# Patient Record
Sex: FEMALE | Race: WHITE | NOT HISPANIC OR LATINO | Employment: OTHER | URBAN - METROPOLITAN AREA
[De-identification: names, ages, dates, MRNs, and addresses within clinical notes are randomized per-mention and may not be internally consistent; named-entity substitution may affect disease eponyms.]

---

## 2018-06-28 ENCOUNTER — TRANSCRIBE ORDERS (OUTPATIENT)
Dept: NEUROLOGY | Facility: CLINIC | Age: 78
End: 2018-06-28

## 2018-06-28 DIAGNOSIS — M54.2 CERVICALGIA: Primary | ICD-10-CM

## 2018-08-13 ENCOUNTER — OFFICE VISIT (OUTPATIENT)
Dept: NEUROLOGY | Facility: CLINIC | Age: 78
End: 2018-08-13
Payer: MEDICARE

## 2018-08-13 VITALS
BODY MASS INDEX: 33.45 KG/M2 | SYSTOLIC BLOOD PRESSURE: 160 MMHG | DIASTOLIC BLOOD PRESSURE: 74 MMHG | HEIGHT: 62 IN | HEART RATE: 72 BPM | WEIGHT: 181.8 LBS

## 2018-08-13 DIAGNOSIS — G44.86 CERVICOGENIC HEADACHE: Primary | ICD-10-CM

## 2018-08-13 DIAGNOSIS — E11.42 POLYNEUROPATHY DUE TO TYPE 2 DIABETES MELLITUS (HCC): ICD-10-CM

## 2018-08-13 DIAGNOSIS — M47.812 SPONDYLOSIS OF CERVICAL REGION WITHOUT MYELOPATHY OR RADICULOPATHY: ICD-10-CM

## 2018-08-13 PROCEDURE — 99204 OFFICE O/P NEW MOD 45 MIN: CPT | Performed by: PSYCHIATRY & NEUROLOGY

## 2018-08-13 RX ORDER — LISINOPRIL 10 MG/1
1 TABLET ORAL DAILY
COMMUNITY
Start: 2018-05-16

## 2018-08-13 RX ORDER — OMEPRAZOLE 40 MG/1
1 CAPSULE, DELAYED RELEASE ORAL DAILY
COMMUNITY
Start: 2018-03-14

## 2018-08-13 RX ORDER — DILTIAZEM HYDROCHLORIDE 300 MG/1
1 CAPSULE, COATED, EXTENDED RELEASE ORAL DAILY
COMMUNITY
Start: 2018-05-26

## 2018-08-13 RX ORDER — FENOFIBRATE 160 MG/1
1 TABLET ORAL DAILY
COMMUNITY
Start: 2018-05-29

## 2018-08-13 RX ORDER — NORTRIPTYLINE HYDROCHLORIDE 10 MG/1
CAPSULE ORAL
Qty: 30 CAPSULE | Refills: 5 | Status: SHIPPED | OUTPATIENT
Start: 2018-08-13

## 2018-08-13 RX ORDER — HYDROCHLOROTHIAZIDE 12.5 MG/1
1 CAPSULE, GELATIN COATED ORAL DAILY
COMMUNITY
Start: 2018-05-26

## 2018-08-13 RX ORDER — ISOSORBIDE MONONITRATE 30 MG/1
1 TABLET, EXTENDED RELEASE ORAL DAILY
COMMUNITY
Start: 2018-05-16

## 2018-08-13 RX ORDER — ROSUVASTATIN CALCIUM 10 MG/1
1 TABLET, COATED ORAL DAILY
COMMUNITY
Start: 2018-08-07

## 2018-08-13 RX ORDER — ACETAMINOPHEN 500 MG
1000 TABLET ORAL EVERY 8 HOURS
COMMUNITY
Start: 2018-04-02

## 2018-08-13 RX ORDER — SERTRALINE HYDROCHLORIDE 100 MG/1
1 TABLET, FILM COATED ORAL DAILY
COMMUNITY
Start: 2018-05-26

## 2018-08-13 NOTE — PROGRESS NOTES
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)        Plan:  Trial of Pamelor 10 mg at bedtime may gradually increase up to 30 mg if necessary  Follow-up 2 months    Discussion:  Rebeca Flak has cervical spondylosis with suspected cervicogenic headache  Had recommended pain management however she is resistant to consider this  Will try nortriptyline 10 mg at bedtime gradually increasing up to 30 mg over several weeks if necessary  We discussed potential adverse effects including sleepiness dizziness and dry mouth and if she has problems she will discontinue the medicine and notify me  Subjective:    HPI  Rebeca Falk presents today with the above complaints  She states that since she was a teenager she has had headaches  She states the headaches would occur once or twice a week and would not be disabling  She had no vascular features associated with the headache  She states that about 6 months ago she was doing some neck exercises and developed severe pain in her neck  She states that it hurts to move her head in any direction and occasionally the pain radiates from her neck up into her head associated with headache  She states that she tried physical therapy for a month and this did alleviate her pain to some degree but has not gotten rid of it  She reports that x-ray showed that she had cervical spondylosis and reports that she cannot have an MRI  She states that with her neck pain symptoms the headaches are now occurring on a daily basis    It was recommended that she try pain management however she is hesitant to consider this      Past Medical History:   Diagnosis Date    Anxiety and depression     Diabetes ([address] )     GERD (gastroesophageal reflux disease)     High cholesterol     Hypertension     Irregular heart beat     Low back pain     Lumbar spondylosis     Migraine     Neck pain     Osteoarthritis        Family History:  Family History   Problem Relation Age of Onset    Thrombosis Mother     Heart failure Father        Past Surgical History:  Past Surgical History:   Procedure Laterality Date    CARDIAC SURGERY      CORONARY ARTERY BYPASS GRAFT  1993    x2       Social History:   reports that she has never smoked  She has never used smokeless tobacco  She reports that she does not drink alcohol or use drugs      Allergies:  Carisoprodol and Codeine      Current Outpatient Prescriptions:     acetaminophen (TYLENOL) 500 mg tablet, Take 1,000 mg by mouth every 8 (eight) hours, Disp: , Rfl:     aspirin (ASPIRIN LOW DOSE) 81 MG tablet, Take 81 mg by mouth daily, Disp: , Rfl:     cholecalciferol (VITAMIN D3) 1,000 units tablet, Take 1,000 Units by mouth daily, Disp: , Rfl:     Coenzyme Q10 (COQ-10) 10 MG CAPS, Take 20 mg by mouth daily, Disp: , Rfl:     diltiazem (CARDIZEM CD) 300 mg 24 hr capsule, Take 1 capsule by mouth daily, Disp: , Rfl:     fenofibrate (TRIGLIDE) 160 MG tablet, Take 1 tablet by mouth daily, Disp: , Rfl:     hydrochlorothiazide (MICROZIDE) 12 5 mg capsule, Take 1 capsule by mouth daily, Disp: , Rfl:     isosorbide mononitrate (IMDUR) 30 mg 24 hr tablet, Take 1 tablet by mouth daily, Disp: , Rfl:     lisinopril (ZESTRIL) 10 mg tablet, Take 1 tablet by mouth daily, Disp: , Rfl:     Magnesium 400 MG CAPS, Take 400 mg by mouth 2 (two) times a day, Disp: , Rfl:     metFORMIN (GLUCOPHAGE) 500 mg tablet, Take 1 tablet by mouth 2 (two) times a day, Disp: , Rfl:     metoprolol tartrate (LOPRESSOR) 25 mg tablet, Take 1 tablet by mouth 2 (two) times a day, Disp: , Rfl:     omeprazole (PriLOSEC) 40 MG capsule, Take 1 capsule by mouth daily, Disp: , Rfl:     rosuvastatin (CRESTOR) 10 MG tablet, Take 1 tablet by mouth daily, Disp: , Rfl:     sertraline (ZOLOFT) 100 mg tablet, Take 1 tablet by mouth daily, Disp: , Rfl:     nortriptyline (PAMELOR) 10 mg capsule, 1-3 pills p o  q h s  as directed, Disp: 30 capsule, Rfl: 5    I have reviewed the past medical, social and family history, current medications, allergies, vitals, review of systems and updated this information as appropriate today     Objective:    Vitals:  Blood pressure 160/74, pulse 72, height 5' 1 5" (1 562 m), weight 82 5 kg (181 lb 12 8 oz)  Physical Exam    Neurological Exam    GENERAL:  Cooperative in no acute distress  Well-developed and well-nourished    HEAD and NECK   Head is atraumatic normocephalic with no lesions or masses  Neck is supple with restricted range of motion to lateral rotation bilaterally  Tenderness is noted in the lateral cervical regions as well as in the cervical paraspinal region bilaterally  No tenderness is noted in the scalp or in the region of the greater occipital nerves    CARDIOVASCULAR  Carotid Arteries-no carotid bruits  NEUROLOGIC:  Mental Status-the patient is awake alert and oriented without aphasia or apraxia  Cranial Nerves: Visual fields are full to confrontation  Discs are flat  Extraocular movements are full without nystagmus  Pupils are 2-1/2 mm and reactive  Face is symmetrical to light touch  Movements of facial expression move symmetrically  Hearing is normal to finger rub bilaterally  Soft palate lifts symmetrically  Shoulder shrug is symmetrical  Tongue is midline without atrophy  Motor: No drift is noted on arm extension  Strength is full in the upper and lower extremities with normal bulk and tone  Sensory:  Diminished temperature and vibratory sensation in the distal lower extremities bilaterally  Cortical function is intact  Coordination: Finger to nose testing is performed accurately  Romberg is reveals increased sway with eyes closed  Gait reveals a normal base with symmetrical arm swing  Tandem walk was performed with difficulty    Reflexes: Trace to 1 in the biceps, triceps, brachioradialis and knee jerk regions, absent at the ankles  Toes are downgoing                  ROS:    Review of Systems   Constitutional: Positive for fatigue  HENT: Positive for tinnitus  Negative for congestion, sinus pain, sinus pressure and trouble swallowing  Eyes: Negative for pain and visual disturbance  Respiratory: Positive for shortness of breath  Negative for cough and wheezing  Cardiovascular: Negative  Gastrointestinal: Negative for nausea and vomiting  Endocrine: Negative for cold intolerance and heat intolerance  Genitourinary: Negative for frequency, hematuria and urgency  Musculoskeletal: Positive for arthralgias, back pain, gait problem, neck pain and neck stiffness  Skin: Negative for rash and wound  Neurological: Positive for light-headedness and headaches  Negative for dizziness, tremors, seizures, syncope, facial asymmetry, speech difficulty, weakness and numbness  Hematological: Negative  Psychiatric/Behavioral: Positive for sleep disturbance  Negative for confusion and decreased concentration  All other systems reviewed and are negative

## 2021-04-23 ENCOUNTER — OFFICE VISIT (OUTPATIENT)
Dept: GASTROENTEROLOGY | Facility: CLINIC | Age: 81
End: 2021-04-23
Payer: MEDICARE

## 2021-04-23 VITALS
WEIGHT: 166 LBS | SYSTOLIC BLOOD PRESSURE: 130 MMHG | HEART RATE: 85 BPM | DIASTOLIC BLOOD PRESSURE: 64 MMHG | BODY MASS INDEX: 30.86 KG/M2

## 2021-04-23 DIAGNOSIS — R13.19 ESOPHAGEAL DYSPHAGIA: Primary | ICD-10-CM

## 2021-04-23 DIAGNOSIS — R93.3 ABNORMAL CT SCAN, ESOPHAGUS: ICD-10-CM

## 2021-04-23 DIAGNOSIS — R93.3 ABNORMAL CT SCAN, GASTROINTESTINAL TRACT: ICD-10-CM

## 2021-04-23 PROCEDURE — 99204 OFFICE O/P NEW MOD 45 MIN: CPT | Performed by: INTERNAL MEDICINE

## 2021-04-23 RX ORDER — POTASSIUM CHLORIDE 750 MG/1
TABLET, EXTENDED RELEASE ORAL
COMMUNITY
Start: 2021-02-08

## 2021-04-23 RX ORDER — LISINOPRIL 20 MG/1
20 TABLET ORAL 2 TIMES DAILY
COMMUNITY
Start: 2021-04-08

## 2021-04-23 RX ORDER — SITAGLIPTIN 25 MG/1
25 TABLET, FILM COATED ORAL DAILY
COMMUNITY
Start: 2021-04-09

## 2021-04-23 RX ORDER — PANTOPRAZOLE SODIUM 40 MG/1
40 TABLET, DELAYED RELEASE ORAL DAILY
COMMUNITY
Start: 2021-03-04

## 2021-04-23 NOTE — PROGRESS NOTES
[facility] - Outpatient Consultation  David Blanc 79 y o  female MRN: [id number]  Encounter: [id number]          ASSESSMENT AND PLAN:      1  Esophageal dysphagia  - EGD; Future    2  Abnormal CT scan, esophagus  - EGD; Future    3  Abnormal CT scan, gastrointestinal tract  - EGD; Future      No additional medications at this time  ______________________________________________________________________    HPI:    This 52-year-old female comes to the office today complaining of dysphagia to solid foods  Sometimes she is experiencing swelling problems even with liquids  She denies any weight loss or weight gain  She denies any abdominal pain  She denies nausea, vomiting, diarrhea, constipation, rectal bleeding, melena, hematemesis  A recent CT scan of the chest, abdomen, and pelvis performed on March 9, 2021 at [facility] showed bilateral sub 6 mm pulmonary nodules, gallstones, cardiomegaly with a 4 cm ascending thoracic aortic aneurysm, enlarged main pulmonary artery, atherosclerosis, degenerative changes of the lumbar spine, and circumferential distal esophageal and gastric wall thickening  The esophagogastroduodenoscopy was recommended  Patient had undergone esophagogastroduodenoscopy and colonoscopy on May 4, 2017 which showed a stomach polyp which was benign  She also underwent a [address] dilation based on the symptom of dysphagia  She had colonic diverticulosis at that time  Colonoscopy performed July 20, 2010 showed a polyp in the rectum which was hyperplastic along with diverticulosis coli  Esophagogastroduodenoscopy was normal at that time  Esophagogastroduodenoscopy performed November 13th 2003 showed grade 1 esophagitis and a hiatal hernia  REVIEW OF SYSTEMS:    CONSTITUTIONAL: Denies any fever, chills, rigors, and weight loss  HEENT: No earache or tinnitus  Denies hearing loss or visual disturbances    CARDIOVASCULAR: No chest pain or palpitations  RESPIRATORY: Denies any cough, hemoptysis, shortness of breath or dyspnea on exertion  GASTROINTESTINAL: As noted in the History of Present Illness  GENITOURINARY: No problems with urination  Denies any hematuria or dysuria  NEUROLOGIC: No dizziness or vertigo, denies headaches  MUSCULOSKELETAL: Denies any muscle or joint pain  SKIN: Denies skin rashes or itching  ENDOCRINE: Denies excessive thirst  Denies intolerance to heat or cold  PSYCHOSOCIAL: Denies depression or anxiety  Denies any recent memory loss         Historical Information   Past Medical History:   Diagnosis Date    Anxiety and depression     Diabetes ([address] )     GERD (gastroesophageal reflux disease)     High cholesterol     Hypertension     Irregular heart beat     Low back pain     Lumbar spondylosis     Migraine     Neck pain     Osteoarthritis      Past Surgical History:   Procedure Laterality Date    CARDIAC SURGERY      CORONARY ARTERY BYPASS GRAFT  1993    x2     Social History   Social History     Substance and Sexual Activity   Alcohol Use No     Social History     Substance and Sexual Activity   Drug Use No     Social History     Tobacco Use   Smoking Status Never Smoker   Smokeless Tobacco Never Used     Family History   Problem Relation Age of Onset    Thrombosis Mother     Heart failure Father        Meds/Allergies       Current Outpatient Medications:     acetaminophen (TYLENOL) 500 mg tablet    aspirin (ASPIRIN LOW DOSE) 81 MG tablet    cholecalciferol (VITAMIN D3) 1,000 units tablet    Coenzyme Q10 (COQ-10) 10 MG CAPS    diltiazem (CARDIZEM CD) 300 mg 24 hr capsule    fenofibrate (TRIGLIDE) 160 MG tablet    hydrochlorothiazide (MICROZIDE) 12 5 mg capsule    isosorbide mononitrate (IMDUR) 30 mg 24 hr tablet    Januvia 25 MG tablet    lisinopril (ZESTRIL) 10 mg tablet    lisinopril (ZESTRIL) 20 mg tablet    Magnesium 400 MG CAPS    metFORMIN (GLUCOPHAGE) 500 mg tablet   metoprolol tartrate (LOPRESSOR) 25 mg tablet    nortriptyline (PAMELOR) 10 mg capsule    omeprazole (PriLOSEC) 40 MG capsule    pantoprazole (PROTONIX) 40 mg tablet    potassium chloride (K-DUR,KLOR-CON) 10 mEq tablet    rosuvastatin (CRESTOR) 10 MG tablet    sertraline (ZOLOFT) 100 mg tablet    Allergies   Allergen Reactions    Carisoprodol Anaphylaxis     Aka (soma)    Codeine Anaphylaxis    Morphine And Related Other (See Comments)     FAINTING           Objective     Blood pressure 130/64, pulse 85, weight 75 3 kg (166 lb)  Body mass index is 30 86 kg/m²  PHYSICAL EXAM:      General Appearance:   Alert, cooperative, no distress   HEENT:   Normocephalic, atraumatic, anicteric      Neck:  Supple, symmetrical, trachea midline   Lungs:   Clear to auscultation bilaterally; no rales, rhonchi or wheezing; respirations unlabored    Heart[de-identified]   Regular rate and rhythm; no murmur, rub, or gallop  Abdomen:   Soft, non-tender, non-distended; normal bowel sounds; no masses, no organomegaly    Genitalia:   Deferred    Rectal:   Deferred    Extremities:  No cyanosis, clubbing or edema    Pulses:  2+ and symmetric    Skin:  No jaundice, rashes, or lesions    Lymph nodes:  No palpable cervical lymphadenopathy        Lab Results:   No visits with results within 1 Day(s) from this visit  Latest known visit with results is:   No results found for any previous visit  Radiology Results:   No results found

## 2021-04-24 ENCOUNTER — HOSPITAL ENCOUNTER (EMERGENCY)
Facility: HOSPITAL | Age: 81
Discharge: HOME | End: 2021-04-24
Attending: EMERGENCY MEDICINE | Admitting: EMERGENCY MEDICINE
Payer: MEDICARE

## 2021-04-24 ENCOUNTER — APPOINTMENT (EMERGENCY)
Dept: RADIOLOGY | Facility: HOSPITAL | Age: 81
End: 2021-04-24
Payer: MEDICARE

## 2021-04-24 VITALS
WEIGHT: 162 LBS | SYSTOLIC BLOOD PRESSURE: 164 MMHG | TEMPERATURE: 97.8 F | OXYGEN SATURATION: 98 % | HEIGHT: 63 IN | DIASTOLIC BLOOD PRESSURE: 78 MMHG | RESPIRATION RATE: 18 BRPM | HEART RATE: 69 BPM | BODY MASS INDEX: 28.7 KG/M2

## 2021-04-24 DIAGNOSIS — S60.00XA FINGER CONTUSION: ICD-10-CM

## 2021-04-24 DIAGNOSIS — S62.609A FINGER FRACTURE: ICD-10-CM

## 2021-04-24 DIAGNOSIS — T14.8XXA CRUSH INJURY: Primary | ICD-10-CM

## 2021-04-24 PROCEDURE — 99283 EMERGENCY DEPT VISIT LOW MDM: CPT

## 2021-04-24 PROCEDURE — 73130 X-RAY EXAM OF HAND: CPT

## 2021-04-24 PROCEDURE — 99282 EMERGENCY DEPT VISIT SF MDM: CPT | Performed by: EMERGENCY MEDICINE

## 2021-04-24 NOTE — ED PROVIDER NOTES
History  Chief Complaint   Patient presents with    Finger Injury     pinched finger under chair; on Eliquis     56-year-old female comes in for evaluation of right ring finger injury  Patient states his finger got caught in between 2 benches  And crushed the tip her finger  Patient has significant bruising and swelling of the pulp of the finger  There is no subungual hematoma  Patient is on Eliquis  History provided by:  Patient   used: No    Hand Pain  Location:  Right ring finger  Quality:  Throbbing  Severity:  Severe  Onset quality:  Sudden  Duration:  2 hours  Timing:  Constant  Progression:  Worsening  Chronicity:  New  Context:  Crushed in between to benches  Ineffective treatments: Ice  Associated symptoms: no abdominal pain, no chest pain, no congestion, no cough, no diarrhea, no ear pain, no fatigue, no fever, no headaches, no rash, no shortness of breath, no sore throat and no wheezing        Prior to Admission Medications   Prescriptions Last Dose Informant Patient Reported? Taking?    Coenzyme Q10 (COQ-10) 10 MG CAPS   Yes No   Sig: Take 20 mg by mouth daily   Januvia 25 MG tablet   Yes No   Sig: Take 25 mg by mouth daily   Magnesium 400 MG CAPS   Yes No   Sig: Take 400 mg by mouth 2 (two) times a day   acetaminophen (TYLENOL) 500 mg tablet   Yes No   Sig: Take 1,000 mg by mouth every 8 (eight) hours   aspirin (ASPIRIN LOW DOSE) 81 MG tablet   Yes No   Sig: Take 81 mg by mouth daily   cholecalciferol (VITAMIN D3) 1,000 units tablet   Yes No   Sig: Take 1,000 Units by mouth daily   diltiazem (CARDIZEM CD) 300 mg 24 hr capsule   Yes No   Sig: Take 1 capsule by mouth daily   fenofibrate (TRIGLIDE) 160 MG tablet   Yes No   Sig: Take 1 tablet by mouth daily   hydrochlorothiazide (MICROZIDE) 12 5 mg capsule   Yes No   Sig: Take 1 capsule by mouth daily   isosorbide mononitrate (IMDUR) 30 mg 24 hr tablet   Yes No   Sig: Take 1 tablet by mouth daily   lisinopril (ZESTRIL) 10 mg tablet   Yes No   Sig: Take 1 tablet by mouth daily   lisinopril (ZESTRIL) 20 mg tablet   Yes No   Sig: Take 20 mg by mouth 2 (two) times a day   metFORMIN (GLUCOPHAGE) 500 mg tablet   Yes No   Sig: Take 1 tablet by mouth 2 (two) times a day   metoprolol tartrate (LOPRESSOR) 25 mg tablet   Yes No   Sig: Take 1 tablet by mouth 2 (two) times a day   nortriptyline (PAMELOR) 10 mg capsule   No No   Si-3 pills p o  q h s  as directed   omeprazole (PriLOSEC) 40 MG capsule   Yes No   Sig: Take 1 capsule by mouth daily   pantoprazole (PROTONIX) 40 mg tablet   Yes No   Sig: Take 40 mg by mouth daily   potassium chloride (K-DUR,KLOR-CON) 10 mEq tablet   Yes No   rosuvastatin (CRESTOR) 10 MG tablet   Yes No   Sig: Take 1 tablet by mouth daily   sertraline (ZOLOFT) 100 mg tablet   Yes No   Sig: Take 1 tablet by mouth daily      Facility-Administered Medications: None       Past Medical History:   Diagnosis Date    Anxiety and depression     Diabetes ([facility])     GERD (gastroesophageal reflux disease)     High cholesterol     Hypertension     Irregular heart beat     Low back pain     Lumbar spondylosis     Migraine     Neck pain     Osteoarthritis        Past Surgical History:   Procedure Laterality Date    CARDIAC SURGERY      CORONARY ARTERY BYPASS GRAFT  1993    x2       Family History   Problem Relation Age of Onset    Thrombosis Mother     Heart failure Father      I have reviewed and agree with the history as documented  E-Cigarette/Vaping    E-Cigarette Use Never User      E-Cigarette/Vaping Substances    Nicotine No     THC No     CBD No     Flavoring No     Other No     Unknown No      Social History     Tobacco Use    Smoking status: Never Smoker    Smokeless tobacco: Never Used   Substance Use Topics    Alcohol use: No    Drug use: No       Review of Systems   Constitutional: Negative for fatigue and fever  HENT: Negative for congestion, ear pain and sore throat      Eyes: Negative for discharge and redness  Respiratory: Negative for apnea, cough, shortness of breath and wheezing  Cardiovascular: Negative for chest pain  Gastrointestinal: Negative for abdominal pain and diarrhea  Endocrine: Negative for cold intolerance and polydipsia  Genitourinary: Negative for difficulty urinating and hematuria  Musculoskeletal: Negative for arthralgias and back pain  Skin: Negative for color change and rash  Allergic/Immunologic: Negative for environmental allergies and immunocompromised state  Neurological: Negative for numbness and headaches  Hematological: Negative for adenopathy  Does not bruise/bleed easily  Psychiatric/Behavioral: Negative for agitation and behavioral problems  Physical Exam  Physical Exam  Vitals signs and nursing note reviewed  Constitutional:       Appearance: Normal appearance  She is well-developed  She is not toxic-appearing  HENT:      Head: Normocephalic and atraumatic  Right Ear: Tympanic membrane and external ear normal       Left Ear: Tympanic membrane and external ear normal       Nose: Nose normal  No nasal deformity or rhinorrhea  Mouth/Throat:      Dentition: Normal dentition  Pharynx: Uvula midline  Eyes:      General: Lids are normal          Right eye: No discharge  Left eye: No discharge  Conjunctiva/sclera: Conjunctivae normal       Pupils: Pupils are equal, round, and reactive to light  Neck:      Musculoskeletal: Normal range of motion and neck supple  Vascular: No carotid bruit or JVD  Trachea: Trachea normal    Cardiovascular:      Rate and Rhythm: Normal rate and regular rhythm  No extrasystoles are present  Chest Wall: PMI is not displaced  Pulses: Normal pulses  Pulmonary:      Effort: Pulmonary effort is normal  No accessory muscle usage or respiratory distress  Breath sounds: Normal breath sounds  No wheezing, rhonchi or rales     Abdominal:      General: Bowel sounds are normal       Palpations: Abdomen is soft  Abdomen is not rigid  There is no mass  Tenderness: There is no abdominal tenderness  There is no guarding or rebound  Musculoskeletal:      Right shoulder: She exhibits normal range of motion, no bony tenderness, no swelling and no deformity  Cervical back: Normal  She exhibits normal range of motion, no tenderness, no bony tenderness and no deformity  Right hand: She exhibits decreased range of motion and tenderness  Hands:    Lymphadenopathy:      Cervical: No cervical adenopathy  Skin:     General: Skin is warm and dry  Findings: No rash  Neurological:      Mental Status: She is alert and oriented to person, place, and time  GCS: GCS eye subscore is 4  GCS verbal subscore is 5  GCS motor subscore is 6  Cranial Nerves: No cranial nerve deficit  Sensory: No sensory deficit  Deep Tendon Reflexes: Reflexes are normal and symmetric     Psychiatric:         Speech: Speech normal          Behavior: Behavior normal          Vital Signs  ED Triage Vitals [04/24/21 1758]   Temperature Pulse Respirations Blood Pressure SpO2   97 8 °F (36 6 °C) 69 18 164/78 98 %      Temp Source Heart Rate Source Patient Position - Orthostatic VS BP Location FiO2 (%)   Oral Monitor -- -- --      Pain Score       3           Vitals:    04/24/21 1758   BP: 164/78   Pulse: 69         Visual Acuity      ED Medications  Medications - No data to display    Diagnostic Studies  Results Reviewed     None                 XR hand 3+ vw right    (Results Pending)              Procedures  Procedures         ED Course                                           MDM  Number of Diagnoses or Management Options  Crush injury: new and requires workup  Finger contusion: new and requires workup  Finger fracture: new and requires workup     Amount and/or Complexity of Data Reviewed  Tests in the radiology section of CPT®: ordered and reviewed  Independent visualization of images, tracings, or specimens: yes (Right ring finger tuft fracture)    Patient Progress  Patient progress: stable      Disposition  Final diagnoses:   Crush injury   Finger contusion   Finger fracture     Time reflects when diagnosis was documented in both MDM as applicable and the Disposition within this note     Time User Action Codes Description Comment    4/24/2021  6:42 PM Kylah Centers  8XXA] Crush injury     4/24/2021  6:42 PM Belgica Blazing K Add [S60 00XA] Finger contusion     4/24/2021  6:42 PM Ren Cole Add [S62 609A] Finger fracture       ED Disposition     ED Disposition Condition Date/Time Comment    Discharge Stable Sat Apr 24, 2021  6:42 PM Abdirizakcyndie Abbott discharge to home/self care  Follow-up Information     Follow up With Specialties Details Why Contact Info    Clif See MD Internal Medicine Schedule an appointment as soon as possible for a visit   [address]  [phone]            Patient's Medications   Discharge Prescriptions    No medications on file     No discharge procedures on file      PDMP Review     None          ED Provider  Electronically Signed by           Pauline Rivero DO  04/24/21 7474

## 2021-05-10 ENCOUNTER — TELEPHONE (OUTPATIENT)
Dept: GASTROENTEROLOGY | Facility: CLINIC | Age: 81
End: 2021-05-10

## 2021-05-10 NOTE — TELEPHONE ENCOUNTER
Bonny Acevedo-  Patient would like to reschedule her egd which is 05/13  Mata Julian   Please phone [phone]

## 2021-05-28 ENCOUNTER — HOSPITAL ENCOUNTER (OUTPATIENT)
Dept: GASTROENTEROLOGY | Facility: HOSPITAL | Age: 81
Setting detail: OUTPATIENT SURGERY
Discharge: HOME | End: 2021-05-28
Attending: INTERNAL MEDICINE
Payer: MEDICARE

## 2021-05-28 ENCOUNTER — ANESTHESIA EVENT (OUTPATIENT)
Dept: GASTROENTEROLOGY | Facility: HOSPITAL | Age: 81
End: 2021-05-28

## 2021-05-28 ENCOUNTER — ANESTHESIA (OUTPATIENT)
Dept: GASTROENTEROLOGY | Facility: HOSPITAL | Age: 81
End: 2021-05-28

## 2021-05-28 VITALS
TEMPERATURE: 97.3 F | RESPIRATION RATE: 16 BRPM | SYSTOLIC BLOOD PRESSURE: 177 MMHG | WEIGHT: 164.46 LBS | HEART RATE: 53 BPM | OXYGEN SATURATION: 96 % | HEIGHT: 63 IN | BODY MASS INDEX: 29.14 KG/M2 | DIASTOLIC BLOOD PRESSURE: 77 MMHG

## 2021-05-28 DIAGNOSIS — R13.19 ESOPHAGEAL DYSPHAGIA: ICD-10-CM

## 2021-05-28 DIAGNOSIS — R93.3 ABNORMAL CT SCAN, ESOPHAGUS: ICD-10-CM

## 2021-05-28 DIAGNOSIS — R93.3 ABNORMAL CT SCAN, GASTROINTESTINAL TRACT: ICD-10-CM

## 2021-05-28 PROBLEM — F32.A DEPRESSION: Status: ACTIVE | Noted: 2017-11-11

## 2021-05-28 PROBLEM — I25.10 CAD (CORONARY ARTERY DISEASE): Status: ACTIVE | Noted: 2017-11-11

## 2021-05-28 PROBLEM — E11.9 TYPE 2 DIABETES MELLITUS WITHOUT COMPLICATION, WITHOUT LONG-TERM CURRENT USE OF INSULIN (HCC): Status: ACTIVE | Noted: 2017-11-11

## 2021-05-28 PROBLEM — I48.0 PAROXYSMAL ATRIAL FIBRILLATION (HCC): Status: ACTIVE | Noted: 2018-09-19

## 2021-05-28 PROBLEM — Z95.1 HX OF CABG: Status: ACTIVE | Noted: 2020-02-04

## 2021-05-28 PROBLEM — I45.10 RBBB: Status: ACTIVE | Noted: 2020-02-04

## 2021-05-28 PROBLEM — I10 HTN, GOAL BELOW 140/90: Status: ACTIVE | Noted: 2021-05-28

## 2021-05-28 PROBLEM — E78.2 MIXED HYPERLIPIDEMIA: Status: ACTIVE | Noted: 2018-07-24

## 2021-05-28 PROBLEM — K21.9 ESOPHAGEAL REFLUX: Status: ACTIVE | Noted: 2020-02-02

## 2021-05-28 LAB — GLUCOSE SERPL-MCNC: 115 MG/DL (ref 65–140)

## 2021-05-28 PROCEDURE — 43248 EGD GUIDE WIRE INSERTION: CPT | Performed by: INTERNAL MEDICINE

## 2021-05-28 PROCEDURE — 88305 TISSUE EXAM BY PATHOLOGIST: CPT | Performed by: PATHOLOGY

## 2021-05-28 PROCEDURE — 82948 REAGENT STRIP/BLOOD GLUCOSE: CPT

## 2021-05-28 PROCEDURE — 43239 EGD BIOPSY SINGLE/MULTIPLE: CPT | Performed by: INTERNAL MEDICINE

## 2021-05-28 RX ORDER — LIDOCAINE HYDROCHLORIDE 10 MG/ML
0.5 INJECTION, SOLUTION EPIDURAL; INFILTRATION; INTRACAUDAL; PERINEURAL ONCE AS NEEDED
Status: DISCONTINUED | OUTPATIENT
Start: 2021-05-28 | End: 2021-06-01 | Stop reason: HOSPADM

## 2021-05-28 RX ORDER — LIDOCAINE HYDROCHLORIDE 20 MG/ML
INJECTION, SOLUTION EPIDURAL; INFILTRATION; INTRACAUDAL; PERINEURAL AS NEEDED
Status: DISCONTINUED | OUTPATIENT
Start: 2021-05-28 | End: 2021-05-28

## 2021-05-28 RX ORDER — PROPOFOL 10 MG/ML
INJECTION, EMULSION INTRAVENOUS AS NEEDED
Status: DISCONTINUED | OUTPATIENT
Start: 2021-05-28 | End: 2021-05-28

## 2021-05-28 RX ORDER — SODIUM CHLORIDE, SODIUM LACTATE, POTASSIUM CHLORIDE, CALCIUM CHLORIDE 600; 310; 30; 20 MG/100ML; MG/100ML; MG/100ML; MG/100ML
50 INJECTION, SOLUTION INTRAVENOUS CONTINUOUS
Status: DISCONTINUED | OUTPATIENT
Start: 2021-05-28 | End: 2021-06-01 | Stop reason: HOSPADM

## 2021-05-28 RX ADMIN — LIDOCAINE HYDROCHLORIDE 100 MG: 20 INJECTION, SOLUTION EPIDURAL; INFILTRATION; INTRACAUDAL; PERINEURAL at 07:21

## 2021-05-28 RX ADMIN — PROPOFOL 100 MG: 10 INJECTION, EMULSION INTRAVENOUS at 07:21

## 2021-05-28 RX ADMIN — PROPOFOL 30 MG: 10 INJECTION, EMULSION INTRAVENOUS at 07:23

## 2021-05-28 RX ADMIN — SODIUM CHLORIDE, SODIUM LACTATE, POTASSIUM CHLORIDE, AND CALCIUM CHLORIDE 50 ML/HR: .6; .31; .03; .02 INJECTION, SOLUTION INTRAVENOUS at 06:56

## 2021-05-28 RX ADMIN — PROPOFOL 3 MG: 10 INJECTION, EMULSION INTRAVENOUS at 07:25

## 2021-05-28 NOTE — H&P
History and Physical -  [facility]  Gary Cruz 79 y o  female MRN: [id number]      HPI: Gary Cruz is a 79y o  year old female who presents for dysphagia, abnormal CT scan of the esophagus and stomach      REVIEW OF SYSTEMS: Per the HPI, and otherwise unremarkable  Historical Information   Past Medical History:   Diagnosis Date    Anxiety and depression     Diabetes ([address] )     GERD (gastroesophageal reflux disease)     High cholesterol     Hypertension     Irregular heart beat     Low back pain     Lumbar spondylosis     Migraine     Neck pain     Osteoarthritis      Past Surgical History:   Procedure Laterality Date    CARDIAC SURGERY      CORONARY ARTERY BYPASS GRAFT  1993    x2     Social History   Social History     Substance and Sexual Activity   Alcohol Use No     Social History     Substance and Sexual Activity   Drug Use No     Social History     Tobacco Use   Smoking Status Never Smoker   Smokeless Tobacco Never Used     Family History   Problem Relation Age of Onset    Thrombosis Mother     Heart failure Father        Meds/Allergies     (Not in a hospital admission)      Allergies   Allergen Reactions    Carisoprodol Anaphylaxis     Aka (soma)    Codeine Anaphylaxis    Morphine And Related Other (See Comments)     FAINTING       Objective     Blood pressure (!) 210/93, pulse 56, temperature 98 1 °F (36 7 °C), temperature source Temporal, resp  rate 20, height 5' 3" (1 6 m), weight 74 6 kg (164 lb 7 4 oz), SpO2 97 %  PHYSICAL EXAM    Gen: NAD  CV: RRR  CHEST: Clear  ABD: soft, NT/ND  EXT: no edema      ASSESSMENT/PLAN:  This is a 79y o  year old female here for esophagogastroduodenoscopy with possible biopsy and dilation, and she is stable and optimized for her procedure

## 2021-05-28 NOTE — ANESTHESIA POSTPROCEDURE EVALUATION
Post-Op Assessment Note    CV Status:  Stable  Pain Score: 0    Pain management: adequate     Mental Status:  Alert and awake   Hydration Status:  Euvolemic   PONV Controlled:  Controlled   Airway Patency:  Patent and adequate      Post Op Vitals Reviewed: Yes      Staff: CRNA         No complications documented      BP  199/86   Temp      Pulse  59   Resp   15   SpO2   96%

## 2021-05-28 NOTE — ANESTHESIA PREPROCEDURE EVALUATION
Procedure:  EGD    Relevant Problems   CARDIO   (+) CAD (coronary artery disease)   (+) HTN, goal below 140/90   (+) Hx of CABG   (+) Mixed hyperlipidemia   (+) Paroxysmal atrial fibrillation (HCC)   (+) RBBB      ENDO   (+) Type 2 diabetes mellitus without complication, without long-term current use of insulin (HCC)      GI/HEPATIC   (+) Esophageal reflux      MUSCULOSKELETAL   (+) Spondylosis of cervical region without myelopathy or radiculopathy      NEURO/PSYCH   (+) Depression   (+) Polyneuropathy due to type 2 diabetes mellitus ([address] )        Physical Exam    Airway  Comment: Edentulous  Mallampati score: II  TM Distance: >3 FB  Neck ROM: full     Dental       Cardiovascular      Pulmonary      Other Findings        2/3/20 TTE:     LEFT VENTRICLE    Normal left ventricular chamber size  Normal left ventricular systolic     function  Normal regional wall motion  Mild concentric left ventricular     hypertrophy  Estimated left ventricular ejection fraction is 70%  RIGHT VENTRICLE      Normal right ventricular size  upper normal  and  normal function  LEFT ATRIUM    Mildly dilated left atrium  RIGHT ATRIUM    borderline dilated      AORTA    Normal aortic root for body surface area  PERICARDIUM    Normal pericardium without effusion  AORTIC VALVE    Structurally normal aortic valve without significant stenosis mild aortic     regurgitation  Aortic sclerosis without stenosis  MITRAL VALVE    Structurally normal mitral valve without significant stenosis or     regurgitation   Trace mitral regurgitation  TRICUSPID VALVE    Structurally normal tricuspid valve without significant stenosis       Moderate tricuspid regurgitation  PULMONIC VALVE    Structurally normal pulmonic valve without significant stenosis or     regurgitation   Trace pulmonic insufficiency  DOPPLER HEMODYNAMICS    Normal pulmonary artery systolic pressure  Normal diastolic function       Anesthesia Plan  ASA Score- 3     Anesthesia Type- IV sedation with anesthesia with ASA Monitors  Additional Monitors:   Airway Plan:     Comment: I discussed the risks and benefits of IV sedation anesthesia including the possibility of the need to convert to general anesthesia and the potential risk of awareness  Plan Factors-Exercise comment: Limited by musculoskeletal pain, but able to walk through grocery store without cardiopulmonary limitation  Chart reviewed  EKG reviewed  Existing labs reviewed  Patient summary reviewed  Patient is not a current smoker  Patient did not smoke on day of surgery  Induction- intravenous  Postoperative Plan-     Informed Consent- Anesthetic plan and risks discussed with patient

## 2021-06-07 ENCOUNTER — TELEPHONE (OUTPATIENT)
Dept: GASTROENTEROLOGY | Facility: CLINIC | Age: 81
End: 2021-06-07

## 2021-06-07 NOTE — TELEPHONE ENCOUNTER
----- Message from Michelle Matos DO sent at 6/4/2021  7:32 AM EDT -----  Please call the patient with the biopsy results  The biopsies the stomach polyp was benign

## 2022-08-04 ENCOUNTER — APPOINTMENT (EMERGENCY)
Dept: CT IMAGING | Facility: HOSPITAL | Age: 82
End: 2022-08-04
Payer: MEDICARE

## 2022-08-04 ENCOUNTER — HOSPITAL ENCOUNTER (EMERGENCY)
Facility: HOSPITAL | Age: 82
Discharge: HOME | End: 2022-08-04
Attending: EMERGENCY MEDICINE
Payer: MEDICARE

## 2022-08-04 ENCOUNTER — APPOINTMENT (EMERGENCY)
Dept: RADIOLOGY | Facility: HOSPITAL | Age: 82
End: 2022-08-04
Payer: MEDICARE

## 2022-08-04 VITALS
SYSTOLIC BLOOD PRESSURE: 178 MMHG | RESPIRATION RATE: 20 BRPM | HEART RATE: 56 BPM | OXYGEN SATURATION: 99 % | DIASTOLIC BLOOD PRESSURE: 80 MMHG | TEMPERATURE: 98.7 F

## 2022-08-04 DIAGNOSIS — S00.03XA HEMATOMA OF SCALP, INITIAL ENCOUNTER: ICD-10-CM

## 2022-08-04 DIAGNOSIS — R07.81 RIB PAIN ON RIGHT SIDE: ICD-10-CM

## 2022-08-04 DIAGNOSIS — M25.552 LEFT HIP PAIN: ICD-10-CM
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
## 2022-08-04 DIAGNOSIS — R51.9 HEADACHE: ICD-10-CM

## 2022-08-04 DIAGNOSIS — R91.1 LUNG NODULE: ICD-10-CM

## 2022-08-04 DIAGNOSIS — S09.90XA INJURY OF HEAD, INITIAL ENCOUNTER: Primary | ICD-10-CM

## 2022-08-04 PROCEDURE — 71250 CT THORAX DX C-: CPT

## 2022-08-04 PROCEDURE — 99284 EMERGENCY DEPT VISIT MOD MDM: CPT

## 2022-08-04 PROCEDURE — 70450 CT HEAD/BRAIN W/O DYE: CPT

## 2022-08-04 PROCEDURE — 99284 EMERGENCY DEPT VISIT MOD MDM: CPT | Performed by: PHYSICIAN ASSISTANT

## 2022-08-04 PROCEDURE — 73521 X-RAY EXAM HIPS BI 2 VIEWS: CPT

## 2022-08-04 RX ORDER — METHOCARBAMOL 500 MG/1
500 TABLET, FILM COATED ORAL 2 TIMES DAILY PRN
Qty: 10 TABLET | Refills: 0 | Status: SHIPPED | OUTPATIENT
Start: 2022-08-04

## 2022-08-04 RX ORDER — LIDOCAINE 50 MG/G
1 PATCH TOPICAL ONCE
Status: DISCONTINUED | OUTPATIENT
Start: 2022-08-04 | End: 2022-08-04 | Stop reason: HOSPADM

## 2022-08-04 RX ORDER — ACETAMINOPHEN 325 MG/1
975 TABLET ORAL ONCE
Status: COMPLETED | OUTPATIENT
Start: 2022-08-04 | End: 2022-08-04

## 2022-08-04 RX ORDER — METHOCARBAMOL 500 MG/1
500 TABLET, FILM COATED ORAL 2 TIMES DAILY PRN
Qty: 10 TABLET | Refills: 0 | Status: SHIPPED | OUTPATIENT
Start: 2022-08-04 | End: 2022-08-04 | Stop reason: CLARIF

## 2022-08-04 RX ORDER — LIDOCAINE 50 MG/G
1 PATCH TOPICAL DAILY
Qty: 15 PATCH | Refills: 0 | Status: SHIPPED | OUTPATIENT
Start: 2022-08-04

## 2022-08-04 RX ORDER — LIDOCAINE 50 MG/G
1 PATCH TOPICAL DAILY
Qty: 15 PATCH | Refills: 0 | Status: SHIPPED | OUTPATIENT
Start: 2022-08-04 | End: 2022-08-04 | Stop reason: CLARIF

## 2022-08-04 RX ADMIN — LIDOCAINE 5% 1 PATCH: 700 PATCH TOPICAL at 12:43

## 2022-08-04 RX ADMIN — ACETAMINOPHEN 975 MG: 325 TABLET ORAL at 12:43

## 2022-08-04 NOTE — DISCHARGE INSTRUCTIONS
Follow-up with your PCP for the following CT findings:   "Incidentally detected less than 6 mm nodules right lung   Based on current Fleischner Society 2017 Guidelines on incidental pulmonary nodule,  follow-up CT at 12 months can be considered if patient is not at risk for any metastatic disease"

## 2022-08-04 NOTE — ED PROVIDER NOTES
History  Chief Complaint   Patient presents with    Pain     Pt arrives s/p fall Sunday, seen in ER that day, hematoma on scalp, negative for intercranial bleeding  Arrives with c/o headache and R rib pain  Pt unsure if they did a chest xray  Patient is an 28-year-old female presenting to the ED for evaluation of a headache and right-sided rib pain after a fall  Patient states that she had a mechanical fall at home on Sunday with (+) head strike  She is on Eliquis  She was seen in the ED at the time of the fall and had a CT head and cervical spine that were normal   She has had a headache since the fall, primarily over the largest scalp hematoma on the right side of her head  She denies any visual disturbances, nausea, vomiting or dizziness  She has also had pain over the right side of her ribs as well as the buttock region (left>right)  She does not believe that these areas were imaged while she was in the ED previously  She denies any chest pain or difficulty breathing  She denies any abdominal pain, back pain, numbness/weakness in lower extremities, bowel/bladder incontinence, urinary retention or saddle anesthesia  Prior to Admission Medications   Prescriptions Last Dose Informant Patient Reported? Taking?    Januvia 25 MG tablet   Yes No   Sig: Take 25 mg by mouth daily   Magnesium 400 MG CAPS   Yes No   Sig: Take 400 mg by mouth 2 (two) times a day   acetaminophen (TYLENOL) 500 mg tablet   Yes No   Sig: Take 1,000 mg by mouth every 8 (eight) hours   aspirin (ASPIRIN LOW DOSE) 81 MG tablet   Yes No   Sig: Take 81 mg by mouth daily   cholecalciferol (VITAMIN D3) 1,000 units tablet   Yes No   Sig: Take 1,000 Units by mouth daily   diltiazem (CARDIZEM CD) 300 mg 24 hr capsule   Yes No   Sig: Take 1 capsule by mouth daily   fenofibrate (TRIGLIDE) 160 MG tablet   Yes No   Sig: Take 1 tablet by mouth daily   hydrochlorothiazide (MICROZIDE) 12 5 mg capsule   Yes No   Sig: Take 1 capsule by mouth daily   isosorbide mononitrate (IMDUR) 30 mg 24 hr tablet   Yes No   Sig: Take 1 tablet by mouth daily   lisinopril (ZESTRIL) 10 mg tablet   Yes No   Sig: Take 1 tablet by mouth daily   lisinopril (ZESTRIL) 20 mg tablet   Yes No   Sig: Take 20 mg by mouth 2 (two) times a day   metFORMIN (GLUCOPHAGE) 500 mg tablet   Yes No   Sig: Take 1 tablet by mouth 2 (two) times a day   metoprolol tartrate (LOPRESSOR) 25 mg tablet   Yes No   Sig: Take 1 tablet by mouth 2 (two) times a day   nortriptyline (PAMELOR) 10 mg capsule   No No   Si-3 pills p o  q h s  as directed   omeprazole (PriLOSEC) 40 MG capsule   Yes No   Sig: Take 1 capsule by mouth daily   pantoprazole (PROTONIX) 40 mg tablet   Yes No   Sig: Take 40 mg by mouth daily   potassium chloride (K-DUR,KLOR-CON) 10 mEq tablet   Yes No   rosuvastatin (CRESTOR) 10 MG tablet   Yes No   Sig: Take 1 tablet by mouth daily   sertraline (ZOLOFT) 100 mg tablet   Yes No   Sig: Take 1 tablet by mouth daily      Facility-Administered Medications: None       Past Medical History:   Diagnosis Date    Anxiety and depression     Diabetes ([facility] [address] )     GERD (gastroesophageal reflux disease)     High cholesterol     Hypertension     Irregular heart beat     Low back pain     Lumbar spondylosis     Migraine     Neck pain     Osteoarthritis        Past Surgical History:   Procedure Laterality Date    CARDIAC SURGERY      CORONARY ARTERY BYPASS GRAFT  1993    x2       Family History   Problem Relation Age of Onset    Thrombosis Mother     Heart failure Father      I have reviewed and agree with the history as documented  E-Cigarette/Vaping    E-Cigarette Use Never User      E-Cigarette/Vaping Substances    Nicotine No     THC No     CBD No     Flavoring No     Other No     Unknown No      Social History     Tobacco Use    Smoking status: Never Smoker    Smokeless tobacco: Never Used   Vaping Use    Vaping Use: Never used   Substance Use Topics    Alcohol use:  No  Drug use: No       Review of Systems   Constitutional: Negative for appetite change, chills, fatigue and fever  HENT: Negative for congestion, rhinorrhea, sinus pressure, sinus pain and sore throat  Eyes: Negative for photophobia and visual disturbance  Respiratory: Negative for cough, shortness of breath and wheezing  Cardiovascular: Negative for chest pain, palpitations and leg swelling  Gastrointestinal: Negative for abdominal pain, blood in stool, constipation, diarrhea, nausea and vomiting  Genitourinary: Negative for difficulty urinating, dysuria, flank pain, frequency, hematuria and urgency  Musculoskeletal: Positive for arthralgias ( left hip and gluteal pain) and myalgias (Right-sided rib pain)  Negative for back pain, joint swelling and neck pain  Neurological: Positive for headaches  Negative for dizziness, syncope, weakness and light-headedness  All other systems reviewed and are negative  Physical Exam  Physical Exam  Vitals and nursing note reviewed  Constitutional:       General: She is awake  Appearance: Normal appearance  She is well-developed  She is not toxic-appearing or diaphoretic  HENT:      Head: Normocephalic and atraumatic  Right Ear: External ear normal       Left Ear: External ear normal       Nose: Nose normal       Mouth/Throat:      Lips: Pink  Mouth: Mucous membranes are moist    Eyes:      General: Lids are normal  No scleral icterus  Conjunctiva/sclera: Conjunctivae normal       Pupils: Pupils are equal, round, and reactive to light  Comments: Pupils equal and reactive to light bilaterally  No nystagmus  Vision grossly intact  Cardiovascular:      Rate and Rhythm: Normal rate and regular rhythm  Pulses: Normal pulses  Radial pulses are 2+ on the right side and 2+ on the left side        Heart sounds: Normal heart sounds, S1 normal and S2 normal    Pulmonary:      Effort: Pulmonary effort is normal  No accessory muscle usage  Breath sounds: Normal breath sounds  No stridor  No decreased breath sounds, wheezing, rhonchi or rales  Comments: Lungs clear and equal to auscultation bilaterally  No wheezing or rhonchi or rales  Chest:      Comments: Tenderness over the right rib cage in the midaxillary line  No overlying ecchymosis or edema  No crepitus  Abdominal:      General: Abdomen is flat  Bowel sounds are normal  There is no distension  Palpations: Abdomen is soft  Tenderness: There is no abdominal tenderness  There is no right CVA tenderness, left CVA tenderness, guarding or rebound  Musculoskeletal:      Cervical back: Full passive range of motion without pain and neck supple  No signs of trauma  No pain with movement  Right lower leg: No edema  Left lower leg: No edema  Legs:    Lymphadenopathy:      Cervical: No cervical adenopathy  Skin:     General: Skin is warm and dry  Capillary Refill: Capillary refill takes less than 2 seconds  Coloration: Skin is not cyanotic, jaundiced or pale  Neurological:      Mental Status: She is alert and oriented to person, place, and time  GCS: GCS eye subscore is 4  GCS verbal subscore is 5  GCS motor subscore is 6  Gait: Gait normal       Comments: No focal neurological deficits  Patient has a normal steady gait without ataxia  No dysarthria, facial asymmetry or visual field deficits  Sensation equal and intact bilaterally  Strength 5/5 bilateral upper lower extremities  Psychiatric:         Mood and Affect: Mood normal          Speech: Speech normal          Behavior: Behavior is cooperative           Vital Signs  ED Triage Vitals [08/04/22 1124]   Temperature Pulse Respirations Blood Pressure SpO2   98 7 °F (37 1 °C) 56 20 (!) 178/80 99 %      Temp Source Heart Rate Source Patient Position - Orthostatic VS BP Location FiO2 (%)   Oral Monitor Sitting Right arm --      Pain Score       -- Vitals:    08/04/22 1124   BP: (!) 178/80   Pulse: 56   Patient Position - Orthostatic VS: Sitting         Visual Acuity      ED Medications  Medications   acetaminophen (TYLENOL) tablet 975 mg (975 mg Oral Given 8/4/22 1243)       Diagnostic Studies  Results Reviewed     None                 CT head without contrast   Final Result by Genesis Helton MD (08/04 1307)      Moderate right parieto-occipital scalp soft tissue swelling/contusion with hematoma measuring approximately 3 7 x 2 1 x 3 3 cm  No acute intracranial process  No skull fracture  Chronic microangiopathy  This study demonstrates a significant  finding and was documented as such in [facility] for liaison and referring practitioner notification  Workstation performed: [id number]         CT chest without contrast   Final Result by Deysi Butts MD (08/04 1323)   No pleural effusion, pneumothorax   No displaced rib fractures   Incidentally detected less than 6 mm nodules right lung  Based on current Fleischner Society 2017 Guidelines on incidental pulmonary nodule,  follow-up CT at 12 months can be considered if patient is not at risk for any metastatic disease      The study was marked in EPIC for significant notification  Workstation performed: [id number]         XR hips bilateral with ap pelvis 2 vw    (Results Pending)              Procedures  Procedures         ED Course                                             MDM  Number of Diagnoses or Management Options  Fall, subsequent encounter  Headache  Hematoma of scalp, initial encounter  Injury of head, initial encounter  Left hip pain  Lung nodule  Rib pain on right side  Diagnosis management comments: Patient is an [de-identified] female presenting to the ED for evaluation of a headache and right-sided rib pain after a fall  CT head negative for ICH  CT chest showed no evidence of rib fractures    No evidence of fracture in the hips or pelvis  Patient had improvement with lidocaine patch and Tylenol  Encouraged patient to apply ice to the hematoma to reduce swelling  She was encouraged to follow up with her family doctor or return to the ED for new/worsening symptoms  The management plan was discussed in detail with the patient at bedside and all questions were answered  Strict ED return instructions were discussed at bedside  Prior to discharge, both verbal and written instructions were provided  We discussed the signs and symptoms that should prompt the patient to return to the ED  All questions were answered and the patient was comfortable with the plan of care and discharged home  The patient agrees to return to the Emergency Department for concerns and/or progression of illness  Amount and/or Complexity of Data Reviewed  Tests in the radiology section of CPT®: ordered and reviewed    Patient Progress  Patient progress: stable      Disposition  Final diagnoses:   Injury of head, initial encounter   Hematoma of scalp, initial encounter   Rib pain on right side   Lung nodule   Headache   Left hip pain   Fall, subsequent encounter     Time reflects when diagnosis was documented in both MDM as applicable and the Disposition within this note     Time User Action Codes Description Comment    8/4/2022  1:47 PM Thomas Ridley [S09 90XA] Injury of head, initial encounter     8/4/2022  1:47 PM Thomas Peace Add [S00 03XA] Hematoma of scalp, initial encounter     8/4/2022  1:48 PM Karie Jimenez Add [R07 81] Rib pain on right side     8/4/2022  1:51 PM Karie Jimenez [R91 1] Lung nodule     8/4/2022  1:51 PM Karie Jimenez Add [R51 9] Headache     8/4/2022  1:52 PM Thomas Ridley [M25 552] Left hip pain     8/4/2022  1:53 PM Thomas Ridley [W55  XXXA] Fall, initial encounter     8/4/2022  1:53 PM Conrado Jimenez [O26  XXXA] Fall, initial encounter     8/4/2022  1:54 PM Thomas Peace Add [W19 XXXD] Fall, subsequent encounter       ED Disposition     ED Disposition   Discharge    Condition   Stable    Date/Time   Thu Aug 4, 2022  1:47 PM    Comment   Joe Willson discharge to home/self care                 Follow-up Information     Follow up With Specialties Details Why Contact Info Additional 200 Kristi Alvares MD Internal Medicine Schedule an appointment as soon as possible for a visit   [address] [facility] [address] [facility] Emergency Medicine  If symptoms worsen [address] [facility] [address] [facility], [address], [place], [place], [address]          Discharge Medication List as of 8/4/2022  2:02 PM      START taking these medications    Details   lidocaine (Lidoderm) 5 % Apply 1 patch topically daily Remove & Discard patch within 12 hours or as directed by MD, Starting u 8/4/2022, Normal      methocarbamol (ROBAXIN) 500 mg tablet Take 1 tablet (500 mg total) by mouth 2 (two) times a day as needed for muscle spasms, Starting Thu 8/4/2022, Normal         CONTINUE these medications which have NOT CHANGED    Details   acetaminophen (TYLENOL) 500 mg tablet Take 1,000 mg by mouth every 8 (eight) hours, Starting Mon 4/2/2018, Historical Med      aspirin (ASPIRIN LOW DOSE) 81 MG tablet Take 81 mg by mouth daily, Historical Med      cholecalciferol (VITAMIN D3) 1,000 units tablet Take 1,000 Units by mouth daily, Historical Med      diltiazem (CARDIZEM CD) 300 mg 24 hr capsule Take 1 capsule by mouth daily, Starting Sat 5/26/2018, Historical Med      fenofibrate (TRIGLIDE) 160 MG tablet Take 1 tablet by mouth daily, Starting Tue 5/29/2018, Historical Med      hydrochlorothiazide (MICROZIDE) 12 5 mg capsule Take 1 capsule by mouth daily, Starting Sat 5/26/2018, Historical Med      isosorbide mononitrate (IMDUR) 30 mg 24 hr tablet Take 1 tablet by mouth daily, Starting Wed 5/16/2018, Historical Med      Januvia 25 MG tablet Take 25 mg by mouth daily, Starting Fri 4/9/2021, Historical Med      !! lisinopril (ZESTRIL) 10 mg tablet Take 1 tablet by mouth daily, Starting Wed 5/16/2018, Historical Med      !! lisinopril (ZESTRIL) 20 mg tablet Take 20 mg by mouth 2 (two) times a day, Starting Thu 4/8/2021, Historical Med      Magnesium 400 MG CAPS Take 400 mg by mouth 2 (two) times a day, Historical Med      metFORMIN (GLUCOPHAGE) 500 mg tablet Take 1 tablet by mouth 2 (two) times a day, Starting Sat 5/26/2018, Historical Med      metoprolol tartrate (LOPRESSOR) 25 mg tablet Take 1 tablet by mouth 2 (two) times a day, Starting Tue 8/7/2018, Historical Med      nortriptyline (PAMELOR) 10 mg capsule 1-3 pills p o  q h s  as directed, Normal      omeprazole (PriLOSEC) 40 MG capsule Take 1 capsule by mouth daily, Starting Wed 3/14/2018, Historical Med      pantoprazole (PROTONIX) 40 mg tablet Take 40 mg by mouth daily, Starting Thu 3/4/2021, Historical Med      potassium chloride (K-DUR,KLOR-CON) 10 mEq tablet Starting Mon 2/8/2021, Historical Med      rosuvastatin (CRESTOR) 10 MG tablet Take 1 tablet by mouth daily, Starting Tue 8/7/2018, Historical Med      sertraline (ZOLOFT) 100 mg tablet Take 1 tablet by mouth daily, Starting Sat 5/26/2018, Historical Med       !! - Potential duplicate medications found  Please discuss with provider  No discharge procedures on file      PDMP Review     None          ED Provider  Electronically Signed by           Carlos Medina PA-C  08/04/22 1211

## 2023-02-15 ENCOUNTER — TELEPHONE (OUTPATIENT)
Dept: NEUROLOGY | Facility: CLINIC | Age: 83
End: 2023-02-15

## 2023-02-15 NOTE — TELEPHONE ENCOUNTER
Left message informing pt that appointment on 5/24 with Dr Jaylyn Thomason needs to be r s  Informed pt that Dr Jaylyn Thomason will be going part time and will only being seeing pts on Wednesdays and Thursday going forward  If pt calls back, please schd accordingly

## 2024-01-17 ENCOUNTER — OFFICE VISIT (OUTPATIENT)
Dept: NEUROLOGY | Facility: CLINIC | Age: 84
End: 2024-01-17
Payer: MEDICARE

## 2024-01-17 VITALS
HEART RATE: 50 BPM | SYSTOLIC BLOOD PRESSURE: 114 MMHG | BODY MASS INDEX: 29.23 KG/M2 | OXYGEN SATURATION: 97 % | DIASTOLIC BLOOD PRESSURE: 62 MMHG | RESPIRATION RATE: 16 BRPM | HEIGHT: 63 IN | WEIGHT: 165 LBS | TEMPERATURE: 97.3 F

## 2024-01-17 DIAGNOSIS — R20.2 PARESTHESIA: ICD-10-CM

## 2024-01-17 DIAGNOSIS — G62.9 PERIPHERAL NEUROPATHY: Primary | ICD-10-CM

## 2024-01-17 DIAGNOSIS — R27.8 SENSORY ATAXIA: ICD-10-CM

## 2024-01-17 DIAGNOSIS — Z91.81 PERSONAL HISTORY OF FALL: ICD-10-CM

## 2024-01-17 PROCEDURE — 99204 OFFICE O/P NEW MOD 45 MIN: CPT | Performed by: PSYCHIATRY & NEUROLOGY

## 2024-01-17 RX ORDER — LANOLIN ALCOHOL/MO/W.PET/CERES
1 CREAM (GRAM) TOPICAL
COMMUNITY
Start: 2023-12-15

## 2024-01-17 RX ORDER — NITROGLYCERIN 0.4 MG/1
0.4 TABLET SUBLINGUAL
COMMUNITY
Start: 2024-01-11

## 2024-01-17 RX ORDER — CHLORTHALIDONE 25 MG/1
25 TABLET ORAL EVERY MORNING
COMMUNITY
Start: 2024-01-03

## 2024-01-17 RX ORDER — APIXABAN 5 MG/1
1 TABLET, FILM COATED ORAL 2 TIMES DAILY
COMMUNITY
Start: 2023-09-12

## 2024-01-17 NOTE — PROGRESS NOTES
Gabby Darby is a 83 y.o. female presents today with a history of balance difficulty status post falls    Assessment:  1. Peripheral neuropathy    2. Paresthesia    3. Sensory ataxia    4. Personal history of fall        Plan:  Blood work  Follow-up 2 months    Discussion:  Gabby had several falls over the summer but has not had any falls recently.  Suspect her balance difficulties are multifactorial including peripheral neuropathy which is most likely secondary to diabetes as well as multiple arthralgias including knees and back.  She has successfully completed physical therapy and is ambulating with a straight cane.  We discussed ways of minimizing fall risk.  Will obtain blood work to rule out secondary etiologies to her neuropathy.  She did have a B12 level checked last month which was on the low side and I recommended supplementing with a multivitamin or B complex.  I will see her back in follow-up in a couple of months      Subjective:    GISELA  Grace returns in follow-up today.  She was seen here about 5 or 6 years ago for headaches which were felt secondary to cervical spondylosis.  She states that she was visiting relatives in [place] this past summer and had a couple of falls.  She states that she had a giant poodle that would jump up on her and would frequently knock her down.  On 1 occasion she struck her head and had some hematoma.  She was seen in the emergency room in August following a head CT which was unremarkable complaining of headaches.  She was referred here.  She states since that time her headaches have improved.  She states she did undergo physical therapy for her balance which she was not sure helped very much but she has fortunately not had any further falls.  She states she gave her poodle away.  She denies any symptoms of neuropathic pain in her feet.  She reports multiple arthralgias including her knees and has recently started gel injections.  She takes Tylenol but is unable to take  anti-inflammatory medication as she is on Eliquis for atrial fibrillation      Past Medical History:   Diagnosis Date    Anxiety and depression     Diabetes (HCC)     GERD (gastroesophageal reflux disease)     High cholesterol     Hypertension     Irregular heart beat     Low back pain     Lumbar spondylosis     Migraine     Neck pain     Osteoarthritis        Family History:  Family History   Problem Relation Age of Onset    Thrombosis Mother     Heart failure Father        Past Surgical History:  Past Surgical History:   Procedure Laterality Date    CARDIAC SURGERY      CORONARY ARTERY BYPASS GRAFT  1993    x2       Social History:   reports that she has never smoked. She has never used smokeless tobacco. She reports that she does not drink alcohol and does not use drugs.    Allergies:  Carisoprodol, Codeine, and Morphine and related      Current Outpatient Medications:     acetaminophen (TYLENOL) 500 mg tablet, Take 1,000 mg by mouth every 8 (eight) hours, Disp: , Rfl:     aspirin (ASPIRIN LOW DOSE) 81 MG tablet, Take 81 mg by mouth daily, Disp: , Rfl:     chlorthalidone 25 mg tablet, Take 25 mg by mouth every morning, Disp: , Rfl:     cholecalciferol (VITAMIN D3) 1,000 units tablet, Take 1,000 Units by mouth daily, Disp: , Rfl:     diltiazem (CARDIZEM CD) 300 mg 24 hr capsule, Take 1 capsule by mouth daily, Disp: , Rfl:     Eliquis 5 MG, Take 1 tablet by mouth 2 (two) times a day, Disp: , Rfl:     fenofibrate (TRIGLIDE) 160 MG tablet, Take 1 tablet by mouth daily, Disp: , Rfl:     ferrous sulfate 325 (65 FE) MG EC tablet, Take 1 tablet by mouth daily with breakfast, Disp: , Rfl:     hydrochlorothiazide (MICROZIDE) 12.5 mg capsule, Take 1 capsule by mouth daily, Disp: , Rfl:     isosorbide mononitrate (IMDUR) 30 mg 24 hr tablet, Take 1 tablet by mouth daily, Disp: , Rfl:     Januvia 25 MG tablet, Take 25 mg by mouth daily, Disp: , Rfl:     lidocaine (Lidoderm) 5 %, Apply 1 patch topically daily Remove & Discard  "patch within 12 hours or as directed by MD, Disp: 15 patch, Rfl: 0    lisinopril (ZESTRIL) 10 mg tablet, Take 1 tablet by mouth daily, Disp: , Rfl:     lisinopril (ZESTRIL) 20 mg tablet, Take 20 mg by mouth 2 (two) times a day, Disp: , Rfl:     Magnesium 400 MG CAPS, Take 400 mg by mouth 2 (two) times a day, Disp: , Rfl:     metFORMIN (GLUCOPHAGE) 500 mg tablet, Take 1 tablet by mouth 2 (two) times a day, Disp: , Rfl:     metoprolol tartrate (LOPRESSOR) 25 mg tablet, Take 1 tablet by mouth 2 (two) times a day, Disp: , Rfl:     nitroglycerin (NITROSTAT) 0.4 mg SL tablet, Place 0.4 mg under the tongue every 5 (five) minutes as needed for chest pain Up to 3 doses .  Call 911 if pain persists, Disp: , Rfl:     nortriptyline (PAMELOR) 10 mg capsule, 1-3 pills p.o. q.h.s. as directed, Disp: 30 capsule, Rfl: 5    omeprazole (PriLOSEC) 40 MG capsule, Take 1 capsule by mouth daily, Disp: , Rfl:     pantoprazole (PROTONIX) 40 mg tablet, Take 40 mg by mouth daily, Disp: , Rfl:     potassium chloride (K-DUR,KLOR-CON) 10 mEq tablet, , Disp: , Rfl:     rosuvastatin (CRESTOR) 10 MG tablet, Take 1 tablet by mouth daily, Disp: , Rfl:     methocarbamol (ROBAXIN) 500 mg tablet, Take 1 tablet (500 mg total) by mouth 2 (two) times a day as needed for muscle spasms (Patient not taking: Reported on 1/17/2024), Disp: 10 tablet, Rfl: 0    sertraline (ZOLOFT) 100 mg tablet, Take 1 tablet by mouth daily (Patient not taking: Reported on 1/17/2024), Disp: , Rfl:     I have reviewed the past medical, social and family history, current medications, allergies, vitals, review of systems and updated this information as appropriate today     Objective:    Vitals:  Blood pressure 114/62, pulse (!) 50, temperature (!) 97.3 °F (36.3 °C), temperature source Temporal, resp. rate 16, height 5' 3\" (1.6 m), weight 74.8 kg (165 lb), SpO2 97%.    Physical Exam    Neurological Exam    GENERAL:  Cooperative in no acute distress. Well-developed and " well-nourished  HEAD and NECK   Head is atraumatic normocephalic with no lesions or masses. Neck is supple with full range of motion  CARDIOVASCULAR  Carotid Arteries-no carotid bruits.  NEUROLOGIC:  Mental Status-the patient is awake alert and oriented without aphasia or apraxia  Cranial Nerves: Visual fields are full to confrontation.  Extraocular movements are full without nystagmus. Pupils are 2-1/2 mm and reactive. Face is symmetrical to light touch. Movements of facial expression move symmetrically. Hearing is normal to finger rub bilaterally. Soft palate lifts symmetrically. Shoulder shrug is symmetrical. Tongue is midline without atrophy.  Motor: No drift is noted on arm extension. Strength is full in the upper and lower extremities with normal bulk and tone.  Sensory: Absent temperature and vibratory sensation in the distal lower extremities bilaterally. Cortical function is intact.  Coordination: Finger to nose testing is performed accurately. Romberg is negative. Gait reveals a normal base with symmetrical arm swing. Tandem walk is normal.  Reflexes: 1/4 in the biceps triceps and brachialis regions, trace at the knees and absent at the ankles.  Toes are downgoing      ROS:    Review of Systems   Constitutional:  Negative for chills and fever.   HENT:  Negative for ear pain and sore throat.    Eyes:  Negative for pain and visual disturbance.   Respiratory:  Negative for cough and shortness of breath.    Cardiovascular:  Negative for chest pain and palpitations.   Gastrointestinal:  Negative for abdominal pain and vomiting.   Genitourinary:  Negative for dysuria and hematuria.   Musculoskeletal:  Positive for gait problem (uses a cane having trouble with her right knee). Negative for arthralgias and back pain.   Skin:  Negative for color change and rash.   Neurological:  Positive for headaches. Negative for seizures and syncope.   All other systems reviewed and are negative.

## 2024-01-26 ENCOUNTER — TELEPHONE (OUTPATIENT)
Dept: NEPHROLOGY | Facility: CLINIC | Age: 84
End: 2024-01-26

## 2024-01-29 ENCOUNTER — CONSULT (OUTPATIENT)
Dept: NEPHROLOGY | Facility: CLINIC | Age: 84
End: 2024-01-29
Payer: MEDICARE

## 2024-01-29 VITALS
HEART RATE: 56 BPM | BODY MASS INDEX: 28.35 KG/M2 | RESPIRATION RATE: 13 BRPM | WEIGHT: 160 LBS | SYSTOLIC BLOOD PRESSURE: 120 MMHG | OXYGEN SATURATION: 95 % | TEMPERATURE: 96.8 F | DIASTOLIC BLOOD PRESSURE: 70 MMHG | HEIGHT: 63 IN

## 2024-01-29 DIAGNOSIS — Z95.1 HX OF CABG: ICD-10-CM

## 2024-01-29 DIAGNOSIS — E11.42 POLYNEUROPATHY DUE TO TYPE 2 DIABETES MELLITUS (HCC): ICD-10-CM

## 2024-01-29 DIAGNOSIS — N18.30 CKD (CHRONIC KIDNEY DISEASE) STAGE 3, GFR 30-59 ML/MIN (HCC): ICD-10-CM

## 2024-01-29 DIAGNOSIS — E11.9 TYPE 2 DIABETES MELLITUS WITHOUT COMPLICATION, WITHOUT LONG-TERM CURRENT USE OF INSULIN (HCC): ICD-10-CM

## 2024-01-29 DIAGNOSIS — M89.9 CHRONIC KIDNEY DISEASE-MINERAL AND BONE DISORDER: Primary | ICD-10-CM

## 2024-01-29 DIAGNOSIS — I10 HTN, GOAL BELOW 140/90: ICD-10-CM

## 2024-01-29 DIAGNOSIS — E83.9 CHRONIC KIDNEY DISEASE-MINERAL AND BONE DISORDER: Primary | ICD-10-CM

## 2024-01-29 DIAGNOSIS — N18.9 CHRONIC KIDNEY DISEASE-MINERAL AND BONE DISORDER: Primary | ICD-10-CM

## 2024-01-29 PROBLEM — R80.9 ALBUMINURIA: Status: ACTIVE | Noted: 2023-04-25

## 2024-01-29 PROCEDURE — 1123F ACP DISCUSS/DSCN MKR DOCD: CPT | Performed by: INTERNAL MEDICINE

## 2024-01-29 PROCEDURE — 99204 OFFICE O/P NEW MOD 45 MIN: CPT | Performed by: INTERNAL MEDICINE

## 2024-01-29 NOTE — PROGRESS NOTES
NEPHROLOGY OFFICE CONSULT  Gabby Darby 83 y.o. female MRN: [id number]    Encounter: [id number] 1/29/2024    REASON FOR VISIT: Gabby Darby is a 83 y.o.female who was referred by Laith Leonard MD (Inactive) for evaluation of Chronic Kidney Disease (Stage 3) and Consult  .    HPI:    Gabby came in today for evaluation management of CKD.  83 woman who does a main problem with diffuse pain was found to have fluctuating kidney function so was advised to see me    Patient history of atrial fibrillation and diabetes.  Seems to be suffering with diffuse osteoarthritis.  Denies taking an nostra painkiller    Patient was found to have fluctuating kidney function by blood test so was advised to see me    Other than pain patient denies any acute complaint    No chest pain no palpitation    Does not report any short of breath    Denies any urinary complaint        REVIEW OF SYSTEMS:    Review of Systems   Constitutional:  Negative for fatigue.   HENT:  Negative for congestion.    Eyes:  Negative for photophobia and pain.   Respiratory:  Negative for chest tightness and shortness of breath.    Cardiovascular:  Negative for chest pain and palpitations.   Gastrointestinal:  Negative for abdominal distention, abdominal pain and blood in stool.   Endocrine: Negative for polydipsia.   Genitourinary:  Negative for difficulty urinating, dysuria, flank pain, hematuria and urgency.   Musculoskeletal:  Positive for arthralgias and back pain.   Skin:  Negative for rash.   Neurological:  Negative for dizziness, light-headedness and headaches.   Hematological:  Does not bruise/bleed easily.   Psychiatric/Behavioral:  Negative for behavioral problems. The patient is not nervous/anxious.          PAST MEDICAL HISTORY:  Past Medical History:   Diagnosis Date    Anxiety and depression     Diabetes (HCC)     GERD (gastroesophageal reflux disease)     High cholesterol     Hypertension     Irregular heart beat     Low back pain      Lumbar spondylosis     Migraine     Neck pain     Osteoarthritis        PAST SURGICAL HISTORY:  Past Surgical History:   Procedure Laterality Date    CARDIAC SURGERY      CORONARY ARTERY BYPASS GRAFT  1993    x2       SOCIAL HISTORY:  Social History     Substance and Sexual Activity   Alcohol Use No     Social History     Substance and Sexual Activity   Drug Use No     Social History     Tobacco Use   Smoking Status Never   Smokeless Tobacco Never       FAMILY HISTORY:  Family History   Problem Relation Age of Onset    Thrombosis Mother     Heart failure Father        MEDICATIONS:    Current Outpatient Medications:     acetaminophen (TYLENOL) 500 mg tablet, Take 1,000 mg by mouth every 8 (eight) hours, Disp: , Rfl:     aspirin (ASPIRIN LOW DOSE) 81 MG tablet, Take 81 mg by mouth daily, Disp: , Rfl:     chlorthalidone 25 mg tablet, Take 25 mg by mouth every morning, Disp: , Rfl:     cholecalciferol (VITAMIN D3) 1,000 units tablet, Take 1,000 Units by mouth daily, Disp: , Rfl:     diltiazem (CARDIZEM CD) 300 mg 24 hr capsule, Take 1 capsule by mouth daily, Disp: , Rfl:     Eliquis 5 MG, Take 1 tablet by mouth 2 (two) times a day, Disp: , Rfl:     fenofibrate (TRIGLIDE) 160 MG tablet, Take 1 tablet by mouth daily, Disp: , Rfl:     ferrous sulfate 325 (65 FE) MG EC tablet, Take 1 tablet by mouth daily with breakfast, Disp: , Rfl:     isosorbide mononitrate (IMDUR) 30 mg 24 hr tablet, Take 1 tablet by mouth daily, Disp: , Rfl:     Januvia 25 MG tablet, Take 25 mg by mouth daily, Disp: , Rfl:     lidocaine (Lidoderm) 5 %, Apply 1 patch topically daily Remove & Discard patch within 12 hours or as directed by MD, Disp: 15 patch, Rfl: 0    lisinopril (ZESTRIL) 20 mg tablet, Take 20 mg by mouth 2 (two) times a day, Disp: , Rfl:     metFORMIN (GLUCOPHAGE) 500 mg tablet, Take 1 tablet by mouth in the morning, Disp: , Rfl:     metoprolol tartrate (LOPRESSOR) 25 mg tablet, Take 1 tablet by mouth 2 (two) times a day, Disp: , Rfl:  "    nitroglycerin (NITROSTAT) 0.4 mg SL tablet, Place 0.4 mg under the tongue every 5 (five) minutes as needed for chest pain Up to 3 doses .  Call 911 if pain persists, Disp: , Rfl:     omeprazole (PriLOSEC) 40 MG capsule, Take 1 capsule by mouth daily, Disp: , Rfl:     pantoprazole (PROTONIX) 40 mg tablet, Take 40 mg by mouth daily, Disp: , Rfl:     potassium chloride (K-DUR,KLOR-CON) 10 mEq tablet, , Disp: , Rfl:     rosuvastatin (CRESTOR) 10 MG tablet, Take 1 tablet by mouth daily, Disp: , Rfl:     hydrochlorothiazide (MICROZIDE) 12.5 mg capsule, Take 1 capsule by mouth daily (Patient not taking: Reported on 1/29/2024), Disp: , Rfl:     lisinopril (ZESTRIL) 10 mg tablet, Take 1 tablet by mouth daily (Patient not taking: Reported on 1/29/2024), Disp: , Rfl:     Magnesium 400 MG CAPS, Take 400 mg by mouth 2 (two) times a day (Patient not taking: Reported on 1/29/2024), Disp: , Rfl:     methocarbamol (ROBAXIN) 500 mg tablet, Take 1 tablet (500 mg total) by mouth 2 (two) times a day as needed for muscle spasms (Patient not taking: Reported on 1/17/2024), Disp: 10 tablet, Rfl: 0    nortriptyline (PAMELOR) 10 mg capsule, 1-3 pills p.o. q.h.s. as directed (Patient not taking: Reported on 1/29/2024), Disp: 30 capsule, Rfl: 5    sertraline (ZOLOFT) 100 mg tablet, Take 1 tablet by mouth daily (Patient not taking: Reported on 1/17/2024), Disp: , Rfl:     PHYSICAL EXAM:  Vitals:    01/29/24 1124   BP: 120/70   BP Location: Right arm   Patient Position: Sitting   Pulse: 56   Resp: 13   Temp: (!) 96.8 °F (36 °C)   TempSrc: Temporal   SpO2: 95%   Weight: 72.6 kg (160 lb)   Height: 5' 3\" (1.6 m)     Body mass index is 28.34 kg/m².    Physical Exam  Constitutional:       General: She is not in acute distress.     Appearance: She is well-developed.   HENT:      Head: Normocephalic.      Mouth/Throat:      Mouth: Mucous membranes are moist.   Eyes:      General: No scleral icterus.     Conjunctiva/sclera: Conjunctivae normal. "   Neck:      Vascular: No JVD.   Cardiovascular:      Rate and Rhythm: Normal rate.      Heart sounds: Normal heart sounds.   Pulmonary:      Effort: Pulmonary effort is normal.      Breath sounds: No wheezing.   Abdominal:      Palpations: Abdomen is soft.      Tenderness: There is no abdominal tenderness.   Musculoskeletal:         General: Normal range of motion.      Cervical back: Neck supple.   Skin:     General: Skin is warm.      Findings: No rash.   Neurological:      Mental Status: She is alert and oriented to person, place, and time.   Psychiatric:         Behavior: Behavior normal.         LAB RESULTS:  Results for orders placed or performed during the hospital encounter of 05/28/21   Tissue Exam   Result Value Ref Range    Case Report       Surgical Pathology Report                         Case: [id number]                                   Authorizing Provider:  Manpreet Nassar DO          Collected:           05/28/2021 0726              Ordering Location:      [facility]       Received:            05/28/2021 [address] [place] [address] Endoscopy                                                             Pathologist:           Amna Reyes DO                                                     Specimen:    Stomach                                                                                    Final Diagnosis       A. Stomach (Polyp), Biopsy:  - Fundic gland polyps.      Additional Information       All reported additional testing was performed with appropriately reactive controls.  These tests were developed and their performance characteristics determined by [facility] or appropriate performing facility, though some tests may be performed on tissues which have not been validated for performance characteristics (such as staining performed on alcohol exposed cell blocks and decalcified tissues).  Results should be interpreted with caution and  "in the context of the patients’ clinical condition. These tests may not be cleared or approved by the U.S. Food and Drug Administration, though the FDA has determined that such clearance or approval is not necessary. These tests are used for clinical purposes and they should not be regarded as investigational or for research. This laboratory has been approved by CLIA 88, designated as a high-complexity laboratory and is qualified to perform these tests.      Interpretation performed at [facility], [address] [place] [address], [place] [address]      Gross Description          A. The specimen is received in formalin, labeled with the patient's name and hospital number, and is designated \"stomach”.  The specimen consists of 3 tan-pink soft tissue fragments ranging from 0.4-0.7 cm in greatest dimension.  The specimen is entirely submitted in a screened cassette.    Note: The estimated total formalin fixation time based upon information provided by the submitting clinician and the standard processing schedule is under 72 hours.  AGerczyk      Clinical Information Cold bx polypectomy    Fingerstick Glucose (POCT)   Result Value Ref Range    POC Glucose 115 65 - 140 mg/dl       ASSESSMENT and PLAN:    CKD (chronic kidney disease) stage 3, GFR 30-59 ml/min ([facility])  No results found for: \"EGFR\", \"CREATININE\"    Patient is a creatinine 1.31 with GFR about 41 which is fluctuating.  Does seems to be stage III CKD.  Possibly because of diabetes with hypertension contributing    Pathophysiology kidney disease discussed at length with the patient.  Importance of hydration and avoiding nephrotoxic medication I discussed with her.  Asymptomatic and progression is also discussed with her    At this point I will repeat a blood and urine test.  Will monitor        Chronic kidney disease-mineral and bone disorder  No results found for: \"EGFR\", \"CREATININE\"    Will check PTH and phosphorus along with vitamin D as part of the CKD " "management    Type 2 diabetes mellitus without complication, without long-term current use of insulin ([facility])    Lab Results   Component Value Date    HGBA1C 6.2 (H) 12/13/2023   Diabetes seems to well-controlled.  Importance of diabetic control and effect on kidney disease discussed with the patient    HTN, goal below 140/90  Very well-controlled    Everything discussed at length with the patient.  I will see him back in 6 months.  We will get blood and urine test before that visit.  Thank you very much for the consultation I will monitor the patient with you          Portions of the record may have been created with voice recognition software. Occasional wrong word or \"sound a like\" substitutions may have occurred due to the inherent limitations of voice recognition software. Read the chart carefully and recognize, using context, where substitutions have occurred.If you have any questions, please contact the dictating provider.   "

## 2024-01-29 NOTE — LETTER
January 29, 2024     Sanjeev Carlson III, MD  [address] [facility] [address]    Patient: Gabby Darby   YOB: 1940   Date of Visit: 1/29/2024       Dear Dr. Carlson:    Thank you for referring Gabby Darby to me for evaluation. Below are my notes for this consultation.    If you have questions, please do not hesitate to call me. I look forward to following your patient along with you.         Sincerely,        Clint Bradley MD        CC: No Recipients    Clint Bradley MD  1/29/2024 12:03 PM  Sign when Signing Visit  NEPHROLOGY OFFICE CONSULT  Gabby Darby 83 y.o. female MRN: [id number]    Encounter: [id number] 1/29/2024    REASON FOR VISIT: Gabby Darby is a 83 y.o.female who was referred by Laith Leonard MD (Inactive) for evaluation of Chronic Kidney Disease (Stage 3) and Consult  .    HPI:    Gabby came in today for evaluation management of CKD.  83 woman who does a main problem with diffuse pain was found to have fluctuating kidney function so was advised to see me    Patient history of atrial fibrillation and diabetes.  Seems to be suffering with diffuse osteoarthritis.  Denies taking an nostra painkiller    Patient was found to have fluctuating kidney function by blood test so was advised to see me    Other than pain patient denies any acute complaint    No chest pain no palpitation    Does not report any short of breath    Denies any urinary complaint        REVIEW OF SYSTEMS:    Review of Systems   Constitutional:  Negative for fatigue.   HENT:  Negative for congestion.    Eyes:  Negative for photophobia and pain.   Respiratory:  Negative for chest tightness and shortness of breath.    Cardiovascular:  Negative for chest pain and palpitations.   Gastrointestinal:  Negative for abdominal distention, abdominal pain and blood in stool.   Endocrine: Negative for polydipsia.   Genitourinary:  Negative for difficulty urinating, dysuria, flank pain, hematuria  and urgency.   Musculoskeletal:  Positive for arthralgias and back pain.   Skin:  Negative for rash.   Neurological:  Negative for dizziness, light-headedness and headaches.   Hematological:  Does not bruise/bleed easily.   Psychiatric/Behavioral:  Negative for behavioral problems. The patient is not nervous/anxious.          PAST MEDICAL HISTORY:  Past Medical History:   Diagnosis Date   • Anxiety and depression    • Diabetes (HCC)    • GERD (gastroesophageal reflux disease)    • High cholesterol    • Hypertension    • Irregular heart beat    • Low back pain    • Lumbar spondylosis    • Migraine    • Neck pain    • Osteoarthritis        PAST SURGICAL HISTORY:  Past Surgical History:   Procedure Laterality Date   • CARDIAC SURGERY     • CORONARY ARTERY BYPASS GRAFT  1993    x2       SOCIAL HISTORY:  Social History     Substance and Sexual Activity   Alcohol Use No     Social History     Substance and Sexual Activity   Drug Use No     Social History     Tobacco Use   Smoking Status Never   Smokeless Tobacco Never       FAMILY HISTORY:  Family History   Problem Relation Age of Onset   • Thrombosis Mother    • Heart failure Father        MEDICATIONS:    Current Outpatient Medications:   •  acetaminophen (TYLENOL) 500 mg tablet, Take 1,000 mg by mouth every 8 (eight) hours, Disp: , Rfl:   •  aspirin (ASPIRIN LOW DOSE) 81 MG tablet, Take 81 mg by mouth daily, Disp: , Rfl:   •  chlorthalidone 25 mg tablet, Take 25 mg by mouth every morning, Disp: , Rfl:   •  cholecalciferol (VITAMIN D3) 1,000 units tablet, Take 1,000 Units by mouth daily, Disp: , Rfl:   •  diltiazem (CARDIZEM CD) 300 mg 24 hr capsule, Take 1 capsule by mouth daily, Disp: , Rfl:   •  Eliquis 5 MG, Take 1 tablet by mouth 2 (two) times a day, Disp: , Rfl:   •  fenofibrate (TRIGLIDE) 160 MG tablet, Take 1 tablet by mouth daily, Disp: , Rfl:   •  ferrous sulfate 325 (65 FE) MG EC tablet, Take 1 tablet by mouth daily with breakfast, Disp: , Rfl:   •  isosorbide  mononitrate (IMDUR) 30 mg 24 hr tablet, Take 1 tablet by mouth daily, Disp: , Rfl:   •  Januvia 25 MG tablet, Take 25 mg by mouth daily, Disp: , Rfl:   •  lidocaine (Lidoderm) 5 %, Apply 1 patch topically daily Remove & Discard patch within 12 hours or as directed by MD, Disp: 15 patch, Rfl: 0  •  lisinopril (ZESTRIL) 20 mg tablet, Take 20 mg by mouth 2 (two) times a day, Disp: , Rfl:   •  metFORMIN (GLUCOPHAGE) 500 mg tablet, Take 1 tablet by mouth in the morning, Disp: , Rfl:   •  metoprolol tartrate (LOPRESSOR) 25 mg tablet, Take 1 tablet by mouth 2 (two) times a day, Disp: , Rfl:   •  nitroglycerin (NITROSTAT) 0.4 mg SL tablet, Place 0.4 mg under the tongue every 5 (five) minutes as needed for chest pain Up to 3 doses .  Call 911 if pain persists, Disp: , Rfl:   •  omeprazole (PriLOSEC) 40 MG capsule, Take 1 capsule by mouth daily, Disp: , Rfl:   •  pantoprazole (PROTONIX) 40 mg tablet, Take 40 mg by mouth daily, Disp: , Rfl:   •  potassium chloride (K-DUR,KLOR-CON) 10 mEq tablet, , Disp: , Rfl:   •  rosuvastatin (CRESTOR) 10 MG tablet, Take 1 tablet by mouth daily, Disp: , Rfl:   •  hydrochlorothiazide (MICROZIDE) 12.5 mg capsule, Take 1 capsule by mouth daily (Patient not taking: Reported on 1/29/2024), Disp: , Rfl:   •  lisinopril (ZESTRIL) 10 mg tablet, Take 1 tablet by mouth daily (Patient not taking: Reported on 1/29/2024), Disp: , Rfl:   •  Magnesium 400 MG CAPS, Take 400 mg by mouth 2 (two) times a day (Patient not taking: Reported on 1/29/2024), Disp: , Rfl:   •  methocarbamol (ROBAXIN) 500 mg tablet, Take 1 tablet (500 mg total) by mouth 2 (two) times a day as needed for muscle spasms (Patient not taking: Reported on 1/17/2024), Disp: 10 tablet, Rfl: 0  •  nortriptyline (PAMELOR) 10 mg capsule, 1-3 pills p.o. q.h.s. as directed (Patient not taking: Reported on 1/29/2024), Disp: 30 capsule, Rfl: 5  •  sertraline (ZOLOFT) 100 mg tablet, Take 1 tablet by mouth daily (Patient not taking: Reported on  "1/17/2024), Disp: , Rfl:     PHYSICAL EXAM:  Vitals:    01/29/24 1124   BP: 120/70   BP Location: Right arm   Patient Position: Sitting   Pulse: 56   Resp: 13   Temp: (!) 96.8 °F (36 °C)   TempSrc: Temporal   SpO2: 95%   Weight: 72.6 kg (160 lb)   Height: 5' 3\" (1.6 m)     Body mass index is 28.34 kg/m².    Physical Exam  Constitutional:       General: She is not in acute distress.     Appearance: She is well-developed.   HENT:      Head: Normocephalic.      Mouth/Throat:      Mouth: Mucous membranes are moist.   Eyes:      General: No scleral icterus.     Conjunctiva/sclera: Conjunctivae normal.   Neck:      Vascular: No JVD.   Cardiovascular:      Rate and Rhythm: Normal rate.      Heart sounds: Normal heart sounds.   Pulmonary:      Effort: Pulmonary effort is normal.      Breath sounds: No wheezing.   Abdominal:      Palpations: Abdomen is soft.      Tenderness: There is no abdominal tenderness.   Musculoskeletal:         General: Normal range of motion.      Cervical back: Neck supple.   Skin:     General: Skin is warm.      Findings: No rash.   Neurological:      Mental Status: She is alert and oriented to person, place, and time.   Psychiatric:         Behavior: Behavior normal.         LAB RESULTS:  Results for orders placed or performed during the hospital encounter of 05/28/21   Tissue Exam   Result Value Ref Range    Case Report       Surgical Pathology Report                         Case: [id number]                                   Authorizing Provider:  Manpreet Nassar DO          Collected:           05/28/2021 0726              Ordering Location:      [facility]       Received:            05/28/2021 [address] [place] [address] Endoscopy                                                             Pathologist:           Amna Reyes DO                                                     Specimen:    Stomach                                                              " "                      Final Diagnosis       A. Stomach (Polyp), Biopsy:  - Fundic gland polyps.      Additional Information       All reported additional testing was performed with appropriately reactive controls.  These tests were developed and their performance characteristics determined by [facility] or appropriate performing facility, though some tests may be performed on tissues which have not been validated for performance characteristics (such as staining performed on alcohol exposed cell blocks and decalcified tissues).  Results should be interpreted with caution and in the context of the patients’ clinical condition. These tests may not be cleared or approved by the U.S. Food and Drug Administration, though the FDA has determined that such clearance or approval is not necessary. These tests are used for clinical purposes and they should not be regarded as investigational or for research. This laboratory has been approved by CLIA 88, designated as a high-complexity laboratory and is qualified to perform these tests.      Interpretation performed at [address] [address]      Gross Description          A. The specimen is received in formalin, labeled with the patient's name and hospital number, and is designated \"stomach”.  The specimen consists of 3 tan-pink soft tissue fragments ranging from 0.4-0.7 cm in greatest dimension.  The specimen is entirely submitted in a screened cassette.    Note: The estimated total formalin fixation time based upon information provided by the submitting clinician and the standard processing schedule is under 72 hours.  [place]      Clinical Information Cold bx polypectomy    Fingerstick Glucose (POCT)   Result Value Ref Range    POC Glucose 115 65 - 140 mg/dl       ASSESSMENT and PLAN:    CKD (chronic kidney disease) stage 3, GFR 30-59 ml/min ([facility])  No results found for: \"EGFR\", \"CREATININE\"    Patient is a creatinine 1.31 " "with GFR about 41 which is fluctuating.  Does seems to be stage III CKD.  Possibly because of diabetes with hypertension contributing    Pathophysiology kidney disease discussed at length with the patient.  Importance of hydration and avoiding nephrotoxic medication I discussed with her.  Asymptomatic and progression is also discussed with her    At this point I will repeat a blood and urine test.  Will monitor        Chronic kidney disease-mineral and bone disorder  No results found for: \"EGFR\", \"CREATININE\"    Will check PTH and phosphorus along with vitamin D as part of the CKD management    Type 2 diabetes mellitus without complication, without long-term current use of insulin ([facility])    Lab Results   Component Value Date    HGBA1C 6.2 (H) 12/13/2023   Diabetes seems to well-controlled.  Importance of diabetic control and effect on kidney disease discussed with the patient    HTN, goal below 140/90  Very well-controlled    Everything discussed at length with the patient.  I will see him back in 6 months.  We will get blood and urine test before that visit.  Thank you very much for the consultation I will monitor the patient with you          Portions of the record may have been created with voice recognition software. Occasional wrong word or \"sound a like\" substitutions may have occurred due to the inherent limitations of voice recognition software. Read the chart carefully and recognize, using context, where substitutions have occurred.If you have any questions, please contact the dictating provider.   "

## 2024-01-29 NOTE — ASSESSMENT & PLAN NOTE
"No results found for: \"EGFR\", \"CREATININE\"    Will check PTH and phosphorus along with vitamin D as part of the CKD management  "

## 2024-01-29 NOTE — ASSESSMENT & PLAN NOTE
"No results found for: \"EGFR\", \"CREATININE\"    Patient is a creatinine 1.31 with GFR about 41 which is fluctuating.  Does seems to be stage III CKD.  Possibly because of diabetes with hypertension contributing    Pathophysiology kidney disease discussed at length with the patient.  Importance of hydration and avoiding nephrotoxic medication I discussed with her.  Asymptomatic and progression is also discussed with her    At this point I will repeat a blood and urine test.  Will monitor      "

## 2024-01-29 NOTE — ASSESSMENT & PLAN NOTE
Lab Results   Component Value Date    HGBA1C 6.2 (H) 12/13/2023   Diabetes seems to well-controlled.  Importance of diabetic control and effect on kidney disease discussed with the patient

## 2024-05-01 ENCOUNTER — TELEPHONE (OUTPATIENT)
Dept: NEUROLOGY | Facility: CLINIC | Age: 84
End: 2024-05-01

## 2024-05-01 NOTE — TELEPHONE ENCOUNTER
Called and LVM for pt to confirm 05/08 appt. Also informed pt provider has ordered blood work to be done prior appt.

## 2024-05-07 ENCOUNTER — TELEPHONE (OUTPATIENT)
Dept: NEUROLOGY | Facility: CLINIC | Age: 84
End: 2024-05-07

## 2024-07-17 ENCOUNTER — TELEPHONE (OUTPATIENT)
Dept: NEPHROLOGY | Facility: CLINIC | Age: 84
End: 2024-07-17

## 2024-07-17 NOTE — TELEPHONE ENCOUNTER
Called left voice message to remind patient to get fasting labs done 1 week prior to 07/30/24 scheduled follow-up appointment with Dr. Clint Bradley MD.

## 2024-07-29 ENCOUNTER — TELEPHONE (OUTPATIENT)
Dept: NEPHROLOGY | Facility: CLINIC | Age: 84
End: 2024-07-29

## 2024-07-29 NOTE — TELEPHONE ENCOUNTER
I called and left a message on machine for patient confirming appointment and also reminder her that she needs to have fasting blood work done for her appointment on Tuesday 7/29/2024 with Dr. ALEXIA Bradley. Elaine Flores, .

## 2024-07-29 NOTE — TELEPHONE ENCOUNTER
I called [employer] @ [phone] to verify eligibility for the patient and as per [employer] patient is currently active since 03/01/2015 with Medicare Supplement Plan F.

## 2024-07-30 ENCOUNTER — TELEPHONE (OUTPATIENT)
Age: 84
End: 2024-07-30

## 2024-07-30 DIAGNOSIS — N18.31 STAGE 3A CHRONIC KIDNEY DISEASE (HCC): Primary | ICD-10-CM

## 2024-07-30 NOTE — TELEPHONE ENCOUNTER
Called left voice message to advise patient labs orders have been mailed at her request. also advised labs are fasting and will need to be done 1 week prior to her scheduled appointment of 12/17/24 with Dr.Umesh Kirk MD.

## 2024-07-30 NOTE — TELEPHONE ENCOUNTER
Gabby would like her lab slip mailed to her for any labs she needs before her upcoming appt. She uses [facility] and I was not able to figure out which location with her.

## 2024-12-02 ENCOUNTER — TELEPHONE (OUTPATIENT)
Dept: NEPHROLOGY | Facility: CLINIC | Age: 84
End: 2024-12-02

## 2024-12-02 NOTE — TELEPHONE ENCOUNTER
Called spoke with patient advised patient to get fasting labs done 1 week prior to 12/17/24 scheduled follow-up appointment with . advised patient as a reminder that labs / testing will need to be done in the appropriate time for there scheduled appointment as there health is very important for us. patient verbalized understanding and is okay with it.

## 2024-12-10 ENCOUNTER — TELEPHONE (OUTPATIENT)
Dept: NEPHROLOGY | Facility: CLINIC | Age: 84
End: 2024-12-10

## 2024-12-10 ENCOUNTER — TELEPHONE (OUTPATIENT)
Age: 84
End: 2024-12-10

## 2024-12-10 NOTE — TELEPHONE ENCOUNTER
LM stating that 12/17 appt was rescheduled for 12/31 at 1100. Asked pt to get labs done and to please call us back to confirm that appt.

## 2024-12-13 ENCOUNTER — TELEPHONE (OUTPATIENT)
Dept: NEPHROLOGY | Facility: CLINIC | Age: 84
End: 2024-12-13

## 2024-12-16 LAB
25(OH)D3+25(OH)D2 SERPL-MCNC: 42 NG/ML (ref 30–100)
ANION GAP SERPL CALCULATED.3IONS-SCNC: 9 MMOL/L (ref 3–11)
BASOPHILS # BLD AUTO: 0.1 THOU/CMM (ref 0–0.1)
BASOPHILS NFR BLD AUTO: 1 %
BUN SERPL-MCNC: 35 MG/DL (ref 7–25)
CALCIUM SERPL-MCNC: 9.8 MG/DL (ref 8.5–10.5)
CHLORIDE SERPL-SCNC: 102 MMOL/L (ref 100–109)
CO2 SERPL-SCNC: 29 MMOL/L (ref 21–31)
CREAT SERPL-MCNC: 1.51 MG/DL (ref 0.4–1.1)
CREAT UR-MCNC: 63.8 MG/DL (ref 50–200)
CYTOLOGY CMNT CVX/VAG CYTO-IMP: ABNORMAL
DIFFERENTIAL METHOD BLD: ABNORMAL
EOSINOPHIL # BLD AUTO: 0.3 THOU/CMM (ref 0–0.5)
EOSINOPHIL NFR BLD AUTO: 6 %
ERYTHROCYTE [DISTWIDTH] IN BLOOD BY AUTOMATED COUNT: 13.9 % (ref 12–16)
GFR/BSA.PRED SERPLBLD CYS-BASED-ARV: 34 ML/MIN/{1.73_M2}
GLUCOSE SERPL-MCNC: 107 MG/DL (ref 65–99)
GLUCOSE UR QL STRIP: NEGATIVE MG/DL
HCT VFR BLD AUTO: 34.2 % (ref 35–43)
HGB BLD-MCNC: 11.4 G/DL (ref 11.5–14.5)
HGB UR QL STRIP: NEGATIVE MG/DL
KETONES UR QL STRIP: NEGATIVE MG/DL
LEUKOCYTE ESTERASE UR QL STRIP: NEGATIVE /UL
LYMPHOCYTES # BLD AUTO: 1.2 THOU/CMM (ref 1–3)
LYMPHOCYTES NFR BLD AUTO: 26 %
MCH RBC QN AUTO: 29.6 PG (ref 26–34)
MCHC RBC AUTO-ENTMCNC: 33.3 G/DL (ref 32–37)
MCV RBC AUTO: 89 FL (ref 80–100)
MONOCYTES # BLD AUTO: 0.3 THOU/CMM (ref 0.3–1)
MONOCYTES NFR BLD AUTO: 7 %
MUCOUS THREADS URNS QL MICRO: NORMAL
NEUTROPHILS # BLD AUTO: 2.9 THOU/CMM (ref 1.8–7.8)
NEUTROPHILS NFR BLD AUTO: 60 %
NITRITE UR QL STRIP: NEGATIVE
PH UR: 6 [PH] (ref 4.5–8)
PHOSPHATE SERPL-MCNC: 3.6 MG/DL (ref 2.3–4.6)
PLATELET # BLD AUTO: 241 THOU/CMM (ref 140–350)
PMV BLD REES-ECKER: 9.1 FL (ref 7.5–11.3)
POTASSIUM SERPL-SCNC: 4.1 MMOL/L (ref 3.5–5.2)
PROT 24H UR-MRATE: NEGATIVE MG/DL
PROT UR-MCNC: 8.5 MG/DL
PROT/CREAT UR: 0.13
RBC # BLD AUTO: 3.84 MILL/CMM (ref 3.7–4.7)
RBC #/AREA URNS HPF: NORMAL /HPF (ref 0–2)
SL AMB POCT URINE COMMENT: NORMAL
SODIUM SERPL-SCNC: 140 MMOL/L (ref 135–145)
SP GR UR: 1.01 (ref 1–1.03)
SQUAMOUS #/AREA URNS HPF: >10 /LPF (ref 0–5)
TRANS CELLS #/AREA URNS HPF: NORMAL /LPF (ref 0–1)
WBC # BLD AUTO: 4.8 THOU/CMM (ref 4–10)
WBC #/AREA URNS HPF: NORMAL /HPF (ref 0–5)

## 2024-12-17 LAB — PTH-INTACT SERPL-MCNC: 12.1 PG/ML (ref 12–88)

## 2024-12-30 ENCOUNTER — TELEPHONE (OUTPATIENT)
Dept: NEPHROLOGY | Facility: CLINIC | Age: 84
End: 2024-12-30

## 2024-12-30 NOTE — TELEPHONE ENCOUNTER
I called and left a message on machine for patient asking if she was able to move her appointment up from 10:45am arrival to a 9:45am arrival time with Dr. ALEXIA Bradley on 12/31/2024 for her nephrology follow up appointment.

## 2024-12-31 ENCOUNTER — OFFICE VISIT (OUTPATIENT)
Dept: NEPHROLOGY | Facility: CLINIC | Age: 84
End: 2024-12-31
Payer: MEDICARE

## 2024-12-31 VITALS
OXYGEN SATURATION: 98 % | HEART RATE: 81 BPM | WEIGHT: 160 LBS | DIASTOLIC BLOOD PRESSURE: 70 MMHG | RESPIRATION RATE: 16 BRPM | TEMPERATURE: 96.9 F | BODY MASS INDEX: 29.44 KG/M2 | SYSTOLIC BLOOD PRESSURE: 110 MMHG | HEIGHT: 62 IN

## 2024-12-31 DIAGNOSIS — N18.32 STAGE 3B CHRONIC KIDNEY DISEASE (HCC): Primary | ICD-10-CM

## 2024-12-31 DIAGNOSIS — I10 HTN, GOAL BELOW 140/90: ICD-10-CM

## 2024-12-31 DIAGNOSIS — E83.9 CHRONIC KIDNEY DISEASE-MINERAL AND BONE DISORDER: ICD-10-CM

## 2024-12-31 DIAGNOSIS — E11.42 POLYNEUROPATHY DUE TO TYPE 2 DIABETES MELLITUS (HCC): ICD-10-CM

## 2024-12-31 DIAGNOSIS — N18.9 CHRONIC KIDNEY DISEASE-MINERAL AND BONE DISORDER: ICD-10-CM

## 2024-12-31 DIAGNOSIS — M89.9 CHRONIC KIDNEY DISEASE-MINERAL AND BONE DISORDER: ICD-10-CM

## 2024-12-31 PROCEDURE — 99214 OFFICE O/P EST MOD 30 MIN: CPT | Performed by: INTERNAL MEDICINE

## 2024-12-31 RX ORDER — LEVOCETIRIZINE DIHYDROCHLORIDE 5 MG/1
1 TABLET, FILM COATED ORAL DAILY
COMMUNITY
Start: 2024-12-12

## 2024-12-31 RX ORDER — HYDRALAZINE HYDROCHLORIDE 10 MG/1
10 TABLET, FILM COATED ORAL 3 TIMES DAILY
COMMUNITY

## 2024-12-31 NOTE — ASSESSMENT & PLAN NOTE
Lab Results   Component Value Date    EGFR 34 (L) 12/16/2024    EGFR 34 (L) 12/16/2024    EGFR 32 (L) 08/20/2024    CREATININE 1.51 (H) 12/16/2024    CREATININE 1.51 (H) 12/16/2024    CREATININE 1.59 (H) 08/20/2024   PTH and phosphorus along with vitamin D are within acceptable range and will continue to monitor

## 2024-12-31 NOTE — ASSESSMENT & PLAN NOTE
Lab Results   Component Value Date    EGFR 34 (L) 12/16/2024    EGFR 34 (L) 12/16/2024    EGFR 32 (L) 08/20/2024    CREATININE 1.51 (H) 12/16/2024    CREATININE 1.51 (H) 12/16/2024    CREATININE 1.59 (H) 08/20/2024     Renal function stable overall with some fluctuation.  Advise hydration and avoiding nephrotoxic medication

## 2024-12-31 NOTE — PROGRESS NOTES
Name: Gabby Darby      : 1940      MRN: [id number]  Encounter Provider: Clint Bradley MD  Encounter Date: 2024   Encounter department: [facility]  :  Assessment & Plan  Stage 3b chronic kidney disease (HCC)  Lab Results   Component Value Date    EGFR 34 (L) 2024    EGFR 34 (L) 2024    EGFR 32 (L) 2024    CREATININE 1.51 (H) 2024    CREATININE 1.51 (H) 2024    CREATININE 1.59 (H) 2024     Renal function stable overall with some fluctuation.  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  Advised to go over medication at home and call us back as there is some confusion medication.  I will see her back in 6 months we will get blood and urine test before that with    History of Present Illness   HPI  Gabby Darby is a 84 y.o. female who presents CKD follow-up    Patient overall doing well were quite anxious and depressed.  Almost crying    Does a home situation that is why she is depressed    Does a diffuse joint pain    No nausea no vomiting    Denies any urinary complaint          Review of Systems   Constitutional:  Negative for fatigue.   HENT:  Negative for congestion.    Eyes:  Negative for photophobia and pain.   Respiratory:  Negative for chest tightness and shortness of breath.    Cardiovascular:   "Negative for chest pain and palpitations.   Gastrointestinal:  Negative for abdominal distention, abdominal pain and blood in stool.   Endocrine: Negative for polydipsia.   Genitourinary:  Negative for difficulty urinating, dysuria, flank pain, hematuria and urgency.   Musculoskeletal:  Positive for arthralgias and back pain.   Skin:  Negative for rash.   Neurological:  Negative for dizziness, light-headedness and headaches.   Hematological:  Does not bruise/bleed easily.   Psychiatric/Behavioral:  Negative for behavioral problems. The patient is nervous/anxious.           Objective   Pulse 81   Temp (!) 96.9 °F (36.1 °C) (Temporal)   Resp 16   Ht 5' 2\" (1.575 m)   Wt 72.6 kg (160 lb)   SpO2 98%   BMI 29.26 kg/m²      Physical Exam  Constitutional:       General: She is not in acute distress.     Appearance: She is well-developed. She is obese.   HENT:      Head: Normocephalic.      Mouth/Throat:      Mouth: Mucous membranes are moist.   Eyes:      General: No scleral icterus.     Conjunctiva/sclera: Conjunctivae normal.   Neck:      Vascular: No JVD.   Cardiovascular:      Rate and Rhythm: Normal rate.      Heart sounds: Normal heart sounds.   Pulmonary:      Effort: Pulmonary effort is normal.      Breath sounds: No wheezing.   Abdominal:      Palpations: Abdomen is soft.      Tenderness: There is no abdominal tenderness.   Musculoskeletal:         General: Normal range of motion.      Cervical back: Neck supple.   Skin:     General: Skin is warm.      Findings: No rash.   Neurological:      Mental Status: She is alert and oriented to person, place, and time.   Psychiatric:         Behavior: Behavior normal.         "

## 2025-06-25 ENCOUNTER — TELEPHONE (OUTPATIENT)
Dept: NEPHROLOGY | Facility: CLINIC | Age: 85
End: 2025-06-25

## 2025-06-27 ENCOUNTER — TELEPHONE (OUTPATIENT)
Dept: NEPHROLOGY | Facility: CLINIC | Age: 85
End: 2025-06-27

## 2025-06-27 NOTE — TELEPHONE ENCOUNTER
Called spoke with patient advised earlier appointment is available with  on 07/02/25 @ 1:40pm. Patient accepted appointment and advised fasting labs will need to be done prior to scheduled appointment. Patient verbalized understanding and is okay with it.

## 2025-06-30 ENCOUNTER — TELEPHONE (OUTPATIENT)
Dept: NEPHROLOGY | Facility: CLINIC | Age: 85
End: 2025-06-30

## 2025-06-30 DIAGNOSIS — N18.32 STAGE 3B CHRONIC KIDNEY DISEASE (HCC): Primary | ICD-10-CM

## 2025-06-30 NOTE — TELEPHONE ENCOUNTER
I called and left message on patients answering machine reminding patient to have non fasting lab work done prior to appointment for Wednesday 07/02/2025 with Dr Medina

## 2025-07-02 ENCOUNTER — OFFICE VISIT (OUTPATIENT)
Dept: NEPHROLOGY | Facility: CLINIC | Age: 85
End: 2025-07-02
Payer: MEDICARE

## 2025-07-02 VITALS
HEIGHT: 62 IN | OXYGEN SATURATION: 96 % | DIASTOLIC BLOOD PRESSURE: 70 MMHG | TEMPERATURE: 99 F | HEART RATE: 62 BPM | SYSTOLIC BLOOD PRESSURE: 120 MMHG | WEIGHT: 163 LBS | BODY MASS INDEX: 30 KG/M2

## 2025-07-02 DIAGNOSIS — M89.9 CHRONIC KIDNEY DISEASE-MINERAL AND BONE DISORDER: ICD-10-CM

## 2025-07-02 DIAGNOSIS — E11.9 TYPE 2 DIABETES MELLITUS WITHOUT COMPLICATION, WITHOUT LONG-TERM CURRENT USE OF INSULIN (HCC): ICD-10-CM

## 2025-07-02 DIAGNOSIS — E11.42 POLYNEUROPATHY DUE TO TYPE 2 DIABETES MELLITUS (HCC): ICD-10-CM

## 2025-07-02 DIAGNOSIS — E83.9 CHRONIC KIDNEY DISEASE-MINERAL AND BONE DISORDER: ICD-10-CM

## 2025-07-02 DIAGNOSIS — I10 HTN, GOAL BELOW 140/90: ICD-10-CM

## 2025-07-02 DIAGNOSIS — N18.32 STAGE 3B CHRONIC KIDNEY DISEASE (HCC): Primary | ICD-10-CM

## 2025-07-02 DIAGNOSIS — N18.4 CHRONIC KIDNEY DISEASE, STAGE 4 (SEVERE) (HCC): ICD-10-CM

## 2025-07-02 DIAGNOSIS — N17.9 AKI (ACUTE KIDNEY INJURY) (HCC): ICD-10-CM

## 2025-07-02 DIAGNOSIS — N18.9 CHRONIC KIDNEY DISEASE-MINERAL AND BONE DISORDER: ICD-10-CM

## 2025-07-02 PROCEDURE — 99214 OFFICE O/P EST MOD 30 MIN: CPT | Performed by: INTERNAL MEDICINE

## 2025-07-02 RX ORDER — DICYCLOMINE HYDROCHLORIDE 10 MG/1
10 CAPSULE ORAL
COMMUNITY
Start: 2025-06-26 | End: 2026-06-26

## 2025-07-02 NOTE — ASSESSMENT & PLAN NOTE
Lab Results   Component Value Date    EGFR 25 (L) 06/23/2025    EGFR 29 (L) 03/20/2025    EGFR 28 (L) 03/12/2025    CREATININE 1.93 (H) 06/23/2025    CREATININE 1.74 (H) 03/20/2025    CREATININE 1.78 (H) 03/12/2025   Possibly stage IV or acute on chronic renal failure.  Will monitor

## 2025-07-02 NOTE — ASSESSMENT & PLAN NOTE
Lab Results   Component Value Date    EGFR 25 (L) 06/23/2025    EGFR 29 (L) 03/20/2025    EGFR 28 (L) 03/12/2025    CREATININE 1.93 (H) 06/23/2025    CREATININE 1.74 (H) 03/20/2025    CREATININE 1.78 (H) 03/12/2025   Fluctuating kidney function.  Patient does have worsening kidney function.  Patient was in hospital with diverticulitis which may have caused worsening kidney function    At this point advised hydration we will repeat the blood work in 1 month

## 2025-07-02 NOTE — PROGRESS NOTES
NEPHROLOGY OFFICE FOLLOW UP  Gabby Darby 84 y.o.female YOB: 1940 MRN: [id number]    Encounter: [id number] DATE: 7/2/2025    REASON FOR VISIT: Gabby Darby is a 84 y.o. female who is here on 7/2/2025 for Follow-up and Chronic Kidney Disease      ASSESSMENT and PLAN:  Gabby was seen today for follow-up and chronic kidney disease.    Diagnoses and all orders for this visit:    Stage 3b chronic kidney disease (HCC)  -     Basic metabolic panel; Future  -     CBC and differential; Future  -     Phosphorus; Future  -     Protein / creatinine ratio, urine; Future  -     PTH, intact; Future  -     UA (URINE) with reflex to Scope; Future  -     Vitamin D 25 hydroxy; Future    Chronic kidney disease-mineral and bone disorder  -     Basic metabolic panel; Future  -     CBC and differential; Future  -     Phosphorus; Future  -     Protein / creatinine ratio, urine; Future  -     PTH, intact; Future  -     UA (URINE) with reflex to Scope; Future  -     Vitamin D 25 hydroxy; Future    HTN, goal below 140/90    Type 2 diabetes mellitus without complication, without long-term current use of insulin ([facility])    LALY (acute kidney injury) ([facility])  -     Basic metabolic panel; Future  -     Basic metabolic panel; Future  -     CBC and differential; Future  -     Phosphorus; Future  -     Protein / creatinine ratio, urine; Future  -     PTH, intact; Future  -     UA (URINE) with reflex to Scope; Future  -     Vitamin D 25 hydroxy; Future    Chronic kidney disease, stage 4 (severe) ([facility])    Polyneuropathy due to type 2 diabetes mellitus ([facility])      Assessment & Plan  Stage 3b chronic kidney disease ([facility])  Lab Results   Component Value Date    EGFR 25 (L) 06/23/2025    EGFR 29 (L) 03/20/2025    EGFR 28 (L) 03/12/2025    CREATININE 1.93 (H) 06/23/2025    CREATININE 1.74 (H) 03/20/2025    CREATININE 1.78 (H) 03/12/2025   Fluctuating kidney function.  Patient does have worsening kidney function.  Patient was in hospital  with diverticulitis which may have caused worsening kidney function    At this point advised hydration we will repeat the blood work in 1 month  Chronic kidney disease-mineral and bone disorder  Lab Results   Component Value Date    EGFR 25 (L) 06/23/2025    EGFR 29 (L) 03/20/2025    EGFR 28 (L) 03/12/2025    CREATININE 1.93 (H) 06/23/2025    CREATININE 1.74 (H) 03/20/2025    CREATININE 1.78 (H) 03/12/2025   PTH and phosphorus along with vitamin D are within acceptable range and will continue to monitor  HTN, goal below 140/90  Very well-controlled  Type 2 diabetes mellitus without complication, without long-term current use of insulin ([facility])    Lab Results   Component Value Date    HGBA1C 6.6 (H) 02/07/2024   Fasting glucose is on higher side and will continue to monitor.  Importance of glucose control discussed with the patient  LALY (acute kidney injury) ([facility])  As a mention about does have worsening kidney function likely because of intercurrent illness.  Advised hydration we will recheck blood test in 1 month  Chronic kidney disease, stage 4 (severe) ([facility])  Lab Results   Component Value Date    EGFR 25 (L) 06/23/2025    EGFR 29 (L) 03/20/2025    EGFR 28 (L) 03/12/2025    CREATININE 1.93 (H) 06/23/2025    CREATININE 1.74 (H) 03/20/2025    CREATININE 1.78 (H) 03/12/2025   Possibly stage IV or acute on chronic renal failure.  Will monitor        Everything discussed with patient at length.  I will see her back in 4 months.  She will get blood work in 1 month and 4 months      HPI:    Patient came for follow-up of CKD.  In between patient was hospitalized with diverticulitis    Overall feeling better    Still on antibiotic    No chest pain no palpitation no shortness of breath    No nausea no vomiting        REVIEW OF SYSTEMS:    Review of Systems   Constitutional:  Negative for fatigue.   HENT:  Negative for congestion.    Eyes:  Negative for photophobia and pain.   Respiratory:  Negative for chest tightness and  "shortness of breath.    Cardiovascular:  Negative for chest pain and palpitations.   Gastrointestinal:  Positive for abdominal pain. Negative for abdominal distention and blood in stool.   Endocrine: Negative for polydipsia.   Genitourinary:  Negative for difficulty urinating, dysuria, flank pain, hematuria and urgency.   Musculoskeletal:  Negative for arthralgias and back pain.   Skin:  Negative for rash.   Neurological:  Negative for dizziness, light-headedness and headaches.   Hematological:  Does not bruise/bleed easily.   Psychiatric/Behavioral:  Negative for behavioral problems. The patient is not nervous/anxious.          PAST MEDICAL HISTORY:  Past Medical History[1]    PAST SURGICAL HISTORY:  Past Surgical History[2]    SOCIAL HISTORY:  Social History     Substance and Sexual Activity   Alcohol Use No     Social History     Substance and Sexual Activity   Drug Use No     Tobacco Use History[3]    FAMILY HISTORY:  Family History[4]    ALLERGY:  Allergies[5]    MEDICATIONS:  Current Medications[6]    PHYSICAL EXAM:  Vitals:    07/02/25 1349   BP: 120/70   BP Location: Right arm   Patient Position: Sitting   Pulse: 62   Temp: 99 °F (37.2 °C)   TempSrc: Tympanic   SpO2: 96%   Weight: 73.9 kg (163 lb)   Height: 5' 2\" (1.575 m)     Body mass index is 29.81 kg/m².    Physical Exam  Constitutional:       General: She is not in acute distress.     Appearance: She is well-developed.   HENT:      Head: Normocephalic.      Mouth/Throat:      Mouth: Mucous membranes are moist.     Eyes:      General: No scleral icterus.     Conjunctiva/sclera: Conjunctivae normal.     Neck:      Vascular: No JVD.     Cardiovascular:      Rate and Rhythm: Normal rate.      Heart sounds: Normal heart sounds.   Pulmonary:      Effort: Pulmonary effort is normal.      Breath sounds: No wheezing.   Abdominal:      Palpations: Abdomen is soft.      Tenderness: There is no abdominal tenderness.     Musculoskeletal:         General: Normal " "range of motion.      Cervical back: Neck supple.     Skin:     General: Skin is warm.      Findings: No rash.     Neurological:      Mental Status: She is alert and oriented to person, place, and time.     Psychiatric:         Behavior: Behavior normal.         LAB RESULTS:  Results for orders placed or performed in visit on 12/16/24   PTH, intact   Result Value Ref Range    PTH, Intact 12.1 12.0 - 88.0 pg/mL         Portions of the record may have been created with voice recognition software. Occasional wrong word or \"sound a like\" substitutions may have occurred due to the inherent limitations of voice recognition software. Read the chart carefully and recognize, using context, where substitutions have occurred. If you have any questions, please contact the dictating provider.        [1]   Past Medical History:  Diagnosis Date    Anxiety and depression     Diabetes (HCC)     GERD (gastroesophageal reflux disease)     High cholesterol     Hypertension     Irregular heart beat     Low back pain     Lumbar spondylosis     Migraine     Neck pain     Osteoarthritis    [2]   Past Surgical History:  Procedure Laterality Date    CARDIAC SURGERY      CORONARY ARTERY BYPASS GRAFT  1993    x2   [3]   Social History  Tobacco Use   Smoking Status Never   Smokeless Tobacco Never   [4]   Family History  Problem Relation Name Age of Onset    Thrombosis Mother      Heart failure Father     [5]   Allergies  Allergen Reactions    Carisoprodol Anaphylaxis     Aka (soma)    Codeine Anaphylaxis    Morphine And Codeine Other (See Comments)     FAINTING   [6]   Current Outpatient Medications:     acetaminophen (TYLENOL) 500 mg tablet, Take 1,000 mg by mouth every 8 (eight) hours, Disp: , Rfl:     aspirin (ASPIRIN LOW DOSE) 81 MG tablet, Take 81 mg by mouth in the morning., Disp: , Rfl:     chlorthalidone 25 mg tablet, Take 25 mg by mouth every morning, Disp: , Rfl:     cholecalciferol (VITAMIN D3) 1,000 units tablet, Take 1,000 Units by " mouth in the morning., Disp: , Rfl:     Diclofenac Sodium (VOLTAREN) 1 %, Apply 2 g topically 4 (four) times a day, Disp: , Rfl:     dicyclomine (BENTYL) 10 mg capsule, Take 10 mg by mouth 4 (four) times a day (before meals and at bedtime), Disp: , Rfl:     Eliquis 5 MG, Take 1 tablet by mouth 2 (two) times a day (Patient taking differently: Take 2.5 mg by mouth in the morning and 2.5 mg in the evening.), Disp: , Rfl:     ferrous sulfate 325 (65 FE) MG EC tablet, Take 1 tablet by mouth daily with breakfast, Disp: , Rfl:     hydrALAZINE (APRESOLINE) 10 mg tablet, Take 10 mg by mouth in the morning and 10 mg in the evening and 10 mg before bedtime., Disp: , Rfl:     isosorbide mononitrate (IMDUR) 30 mg 24 hr tablet, Take 1 tablet by mouth in the morning., Disp: , Rfl:     levocetirizine (XYZAL) 5 MG tablet, Take 1 tablet by mouth in the morning, Disp: , Rfl:     lidocaine (Lidoderm) 5 %, Apply 1 patch topically daily Remove & Discard patch within 12 hours or as directed by MD, Disp: 15 patch, Rfl: 0    lisinopril (ZESTRIL) 10 mg tablet, Take 1 tablet by mouth daily (Patient taking differently: Take 20 mg by mouth 2 (two) times a day), Disp: , Rfl:     lisinopril (ZESTRIL) 20 mg tablet, Take 20 mg by mouth in the morning and 20 mg in the evening., Disp: , Rfl:     Magnesium 400 MG CAPS, Take 400 mg by mouth 2 (two) times a day, Disp: , Rfl:     metFORMIN (GLUCOPHAGE) 500 mg tablet, Take 1 tablet by mouth in the morning (Patient taking differently: Take 1,000 mg by mouth in the morning), Disp: , Rfl:     pantoprazole (PROTONIX) 40 mg tablet, Take 40 mg by mouth in the morning., Disp: , Rfl:     rosuvastatin (CRESTOR) 10 MG tablet, Take 1 tablet by mouth in the morning., Disp: , Rfl:     diltiazem (CARDIZEM CD) 300 mg 24 hr capsule, Take 1 capsule by mouth in the morning., Disp: , Rfl:     fenofibrate (TRIGLIDE) 160 MG tablet, Take 1 tablet by mouth in the morning. (Patient not taking: Reported on 7/2/2025), Disp: , Rfl:      hydrochlorothiazide (MICROZIDE) 12.5 mg capsule, Take 1 capsule by mouth daily (Patient not taking: Reported on 1/29/2024), Disp: , Rfl:     Januvia 25 MG tablet, Take 25 mg by mouth daily (Patient not taking: Reported on 7/2/2025), Disp: , Rfl:     methocarbamol (ROBAXIN) 500 mg tablet, Take 1 tablet (500 mg total) by mouth 2 (two) times a day as needed for muscle spasms (Patient not taking: Reported on 1/17/2024), Disp: 10 tablet, Rfl: 0    metoprolol tartrate (LOPRESSOR) 25 mg tablet, Take 1 tablet by mouth 2 (two) times a day (Patient not taking: Reported on 7/2/2025), Disp: , Rfl:     nitroglycerin (NITROSTAT) 0.4 mg SL tablet, Place 0.4 mg under the tongue every 5 (five) minutes as needed for chest pain Up to 3 doses .  Call 911 if pain persists (Patient not taking: Reported on 7/2/2025), Disp: , Rfl:     nortriptyline (PAMELOR) 10 mg capsule, 1-3 pills p.o. q.h.s. as directed (Patient not taking: Reported on 1/29/2024), Disp: 30 capsule, Rfl: 5    omeprazole (PriLOSEC) 40 MG capsule, Take 1 capsule by mouth daily (Patient not taking: Reported on 7/2/2025), Disp: , Rfl:     potassium chloride (K-DUR,KLOR-CON) 10 mEq tablet, , Disp: , Rfl:     sertraline (ZOLOFT) 100 mg tablet, Take 1 tablet by mouth daily (Patient not taking: Reported on 1/17/2024), Disp: , Rfl:

## 2025-07-02 NOTE — ASSESSMENT & PLAN NOTE
Lab Results   Component Value Date    HGBA1C 6.6 (H) 02/07/2024   Fasting glucose is on higher side and will continue to monitor.  Importance of glucose control discussed with the patient

## 2025-07-02 NOTE — ASSESSMENT & PLAN NOTE
As a mention about does have worsening kidney function likely because of intercurrent illness.  Advised hydration we will recheck blood test in 1 month

## 2025-07-02 NOTE — ASSESSMENT & PLAN NOTE
Lab Results   Component Value Date    EGFR 25 (L) 06/23/2025    EGFR 29 (L) 03/20/2025    EGFR 28 (L) 03/12/2025    CREATININE 1.93 (H) 06/23/2025    CREATININE 1.74 (H) 03/20/2025    CREATININE 1.78 (H) 03/12/2025   PTH and phosphorus along with vitamin D are within acceptable range and will continue to monitor

## 2025-07-14 LAB
25(OH)D3+25(OH)D2 SERPL-MCNC: 52 NG/ML (ref 30–100)
ANION GAP SERPL CALCULATED.3IONS-SCNC: 9 MMOL/L (ref 3–11)
BASOPHILS # BLD AUTO: 0.1 THOU/CMM (ref 0–0.1)
BASOPHILS NFR BLD AUTO: 1 %
BUN SERPL-MCNC: 41 MG/DL (ref 7–25)
CALCIUM SERPL-MCNC: 10 MG/DL (ref 8.5–10.5)
CHLORIDE SERPL-SCNC: 101 MMOL/L (ref 100–109)
CO2 SERPL-SCNC: 29 MMOL/L (ref 21–31)
CREAT SERPL-MCNC: 1.48 MG/DL (ref 0.4–1.1)
CREAT UR-MCNC: 56.9 MG/DL (ref 50–200)
CYTOLOGY CMNT CVX/VAG CYTO-IMP: ABNORMAL
DIFFERENTIAL METHOD BLD: NORMAL
EOSINOPHIL # BLD AUTO: 0.3 THOU/CMM (ref 0–0.5)
EOSINOPHIL NFR BLD AUTO: 5 %
ERYTHROCYTE [DISTWIDTH] IN BLOOD BY AUTOMATED COUNT: 13.7 % (ref 12–16)
GFR/BSA.PRED SERPLBLD CYS-BASED-ARV: 35 ML/MIN/{1.73_M2}
GLUCOSE SERPL-MCNC: 123 MG/DL (ref 65–99)
GLUCOSE UR QL STRIP: NEGATIVE MG/DL
HCT VFR BLD AUTO: 35.6 % (ref 35–43)
HGB BLD-MCNC: 11.7 G/DL (ref 11.5–14.5)
HGB UR QL STRIP: NEGATIVE MG/DL
KETONES UR QL STRIP: NEGATIVE MG/DL
LEUKOCYTE ESTERASE UR QL STRIP: NEGATIVE /UL
LYMPHOCYTES # BLD AUTO: 1.4 THOU/CMM (ref 1–3)
LYMPHOCYTES NFR BLD AUTO: 26 %
MCH RBC QN AUTO: 29.3 PG (ref 26–34)
MCHC RBC AUTO-ENTMCNC: 32.9 G/DL (ref 32–37)
MCV RBC AUTO: 89 FL (ref 80–100)
MONOCYTES # BLD AUTO: 0.3 THOU/CMM (ref 0.3–1)
MONOCYTES NFR BLD AUTO: 6 %
NEUTROPHILS # BLD AUTO: 3.3 THOU/CMM (ref 1.8–7.8)
NEUTROPHILS NFR BLD AUTO: 62 %
NITRITE UR QL STRIP: NEGATIVE
PH UR: 6 [PH] (ref 4.5–8)
PHOSPHATE SERPL-MCNC: 3.5 MG/DL (ref 2.3–4.6)
PLATELET # BLD AUTO: 261 THOU/CMM (ref 140–350)
PMV BLD REES-ECKER: 9.2 FL (ref 7.5–11.3)
POTASSIUM SERPL-SCNC: 4.2 MMOL/L (ref 3.5–5.2)
PROT 24H UR-MRATE: NEGATIVE MG/DL
PROT UR-MCNC: 5.2 MG/DL
PROT/CREAT UR: 0.09
PTH-INTACT SERPL-MCNC: 12.3 PG/ML (ref 12–88)
RBC # BLD AUTO: 3.99 MILL/CMM (ref 3.7–4.7)
SL AMB POCT URINE COMMENT: NORMAL
SODIUM SERPL-SCNC: 139 MMOL/L (ref 135–145)
SP GR UR: 1.01 (ref 1–1.03)
WBC # BLD AUTO: 5.4 THOU/CMM (ref 4–10)

## 2025-07-26 DIAGNOSIS — Z00.6 ENCOUNTER FOR EXAMINATION FOR NORMAL COMPARISON OR CONTROL IN CLINICAL RESEARCH PROGRAM: ICD-10-CM

## 2025-07-28 ENCOUNTER — APPOINTMENT (OUTPATIENT)
Age: 85
End: 2025-07-28
Payer: MEDICARE

## 2025-07-28 DIAGNOSIS — Z00.6 ENCOUNTER FOR EXAMINATION FOR NORMAL COMPARISON OR CONTROL IN CLINICAL RESEARCH PROGRAM: ICD-10-CM

## 2025-07-28 DIAGNOSIS — N17.9 AKI (ACUTE KIDNEY INJURY) (HCC): ICD-10-CM

## 2025-07-28 DIAGNOSIS — N18.32 STAGE 3B CHRONIC KIDNEY DISEASE (HCC): ICD-10-CM

## 2025-07-28 LAB
25(OH)D3 SERPL-MCNC: 71.7 NG/ML (ref 30–100)
ANION GAP SERPL CALCULATED.3IONS-SCNC: 8 MMOL/L (ref 4–13)
BACTERIA UR QL AUTO: ABNORMAL /HPF
BASOPHILS # BLD AUTO: 0.06 THOUSANDS/ÂΜL (ref 0–0.1)
BASOPHILS NFR BLD AUTO: 1 % (ref 0–1)
BILIRUB UR QL STRIP: NEGATIVE
BUN SERPL-MCNC: 36 MG/DL (ref 5–25)
CALCIUM SERPL-MCNC: 10 MG/DL (ref 8.4–10.2)
CHLORIDE SERPL-SCNC: 101 MMOL/L (ref 96–108)
CLARITY UR: CLEAR
CO2 SERPL-SCNC: 30 MMOL/L (ref 21–32)
COLOR UR: ABNORMAL
CREAT SERPL-MCNC: 1.37 MG/DL (ref 0.6–1.3)
CREAT UR-MCNC: 71.2 MG/DL
EOSINOPHIL # BLD AUTO: 0.3 THOUSAND/ÂΜL (ref 0–0.61)
EOSINOPHIL NFR BLD AUTO: 6 % (ref 0–6)
ERYTHROCYTE [DISTWIDTH] IN BLOOD BY AUTOMATED COUNT: 12.9 % (ref 11.6–15.1)
GFR SERPL CREATININE-BSD FRML MDRD: 35 ML/MIN/1.73SQ M
GLUCOSE P FAST SERPL-MCNC: 148 MG/DL (ref 65–99)
GLUCOSE UR STRIP-MCNC: NEGATIVE MG/DL
HCT VFR BLD AUTO: 35.3 % (ref 34.8–46.1)
HGB BLD-MCNC: 11.3 G/DL (ref 11.5–15.4)
HGB UR QL STRIP.AUTO: NEGATIVE
IMM GRANULOCYTES # BLD AUTO: 0.02 THOUSAND/UL (ref 0–0.2)
IMM GRANULOCYTES NFR BLD AUTO: 0 % (ref 0–2)
KETONES UR STRIP-MCNC: NEGATIVE MG/DL
LEUKOCYTE ESTERASE UR QL STRIP: ABNORMAL
LYMPHOCYTES # BLD AUTO: 1.24 THOUSANDS/ÂΜL (ref 0.6–4.47)
LYMPHOCYTES NFR BLD AUTO: 23 % (ref 14–44)
MCH RBC QN AUTO: 29.2 PG (ref 26.8–34.3)
MCHC RBC AUTO-ENTMCNC: 32 G/DL (ref 31.4–37.4)
MCV RBC AUTO: 91 FL (ref 82–98)
MONOCYTES # BLD AUTO: 0.33 THOUSAND/ÂΜL (ref 0.17–1.22)
MONOCYTES NFR BLD AUTO: 6 % (ref 4–12)
NEUTROPHILS # BLD AUTO: 3.49 THOUSANDS/ÂΜL (ref 1.85–7.62)
NEUTS SEG NFR BLD AUTO: 64 % (ref 43–75)
NITRITE UR QL STRIP: NEGATIVE
NON-SQ EPI CELLS URNS QL MICRO: ABNORMAL /HPF
NRBC BLD AUTO-RTO: 0 /100 WBCS
PH UR STRIP.AUTO: 6 [PH]
PHOSPHATE SERPL-MCNC: 3.2 MG/DL (ref 2.3–4.1)
PLATELET # BLD AUTO: 238 THOUSANDS/UL (ref 149–390)
PMV BLD AUTO: 11.1 FL (ref 8.9–12.7)
POTASSIUM SERPL-SCNC: 4.5 MMOL/L (ref 3.5–5.3)
PROT UR STRIP-MCNC: NEGATIVE MG/DL
PROT UR-MCNC: 5.9 MG/DL
PROT/CREAT UR: 0.1 MG/G{CREAT}
PTH-INTACT SERPL-MCNC: 12.8 PG/ML (ref 12–88)
RBC # BLD AUTO: 3.87 MILLION/UL (ref 3.81–5.12)
RBC #/AREA URNS AUTO: ABNORMAL /HPF
SODIUM SERPL-SCNC: 139 MMOL/L (ref 135–147)
SP GR UR STRIP.AUTO: 1.01 (ref 1–1.03)
UROBILINOGEN UR STRIP-ACNC: <2 MG/DL
WBC # BLD AUTO: 5.44 THOUSAND/UL (ref 4.31–10.16)
WBC #/AREA URNS AUTO: ABNORMAL /HPF

## 2025-07-28 PROCEDURE — 85025 COMPLETE CBC W/AUTO DIFF WBC: CPT

## 2025-07-28 PROCEDURE — 36415 COLL VENOUS BLD VENIPUNCTURE: CPT

## 2025-07-28 PROCEDURE — 81001 URINALYSIS AUTO W/SCOPE: CPT

## 2025-07-28 PROCEDURE — 84156 ASSAY OF PROTEIN URINE: CPT

## 2025-07-28 PROCEDURE — 80048 BASIC METABOLIC PNL TOTAL CA: CPT

## 2025-07-28 PROCEDURE — 82306 VITAMIN D 25 HYDROXY: CPT

## 2025-07-28 PROCEDURE — 82570 ASSAY OF URINE CREATININE: CPT

## 2025-07-28 PROCEDURE — 84100 ASSAY OF PHOSPHORUS: CPT

## 2025-07-28 PROCEDURE — 83970 ASSAY OF PARATHORMONE: CPT

## 2025-07-30 ENCOUNTER — OFFICE VISIT (OUTPATIENT)
Dept: GASTROENTEROLOGY | Facility: CLINIC | Age: 85
End: 2025-07-30
Payer: MEDICARE

## 2025-07-30 ENCOUNTER — APPOINTMENT (OUTPATIENT)
Age: 85
End: 2025-07-30
Payer: MEDICARE

## 2025-07-30 VITALS
HEART RATE: 75 BPM | BODY MASS INDEX: 30.55 KG/M2 | HEIGHT: 62 IN | OXYGEN SATURATION: 98 % | WEIGHT: 166 LBS | TEMPERATURE: 97.5 F | SYSTOLIC BLOOD PRESSURE: 142 MMHG | DIASTOLIC BLOOD PRESSURE: 78 MMHG

## 2025-07-30 DIAGNOSIS — Z11.59 ENCOUNTER FOR SCREENING FOR OTHER VIRAL DISEASES: ICD-10-CM

## 2025-07-30 DIAGNOSIS — K74.69 OTHER CIRRHOSIS OF LIVER (HCC): ICD-10-CM

## 2025-07-30 DIAGNOSIS — K74.69 OTHER CIRRHOSIS OF LIVER (HCC): Primary | ICD-10-CM

## 2025-07-30 PROCEDURE — 86038 ANTINUCLEAR ANTIBODIES: CPT

## 2025-07-30 PROCEDURE — 99204 OFFICE O/P NEW MOD 45 MIN: CPT | Performed by: INTERNAL MEDICINE

## 2025-07-30 PROCEDURE — 86225 DNA ANTIBODY NATIVE: CPT

## 2025-07-30 PROCEDURE — 36415 COLL VENOUS BLD VENIPUNCTURE: CPT

## 2025-07-30 PROCEDURE — 82728 ASSAY OF FERRITIN: CPT

## 2025-07-30 PROCEDURE — 86015 ACTIN ANTIBODY EACH: CPT

## 2025-07-30 PROCEDURE — 87340 HEPATITIS B SURFACE AG IA: CPT

## 2025-07-30 PROCEDURE — 83540 ASSAY OF IRON: CPT

## 2025-07-30 PROCEDURE — 86803 HEPATITIS C AB TEST: CPT

## 2025-07-30 PROCEDURE — 83550 IRON BINDING TEST: CPT

## 2025-07-30 RX ORDER — POTASSIUM CHLORIDE 750 MG/1
10 TABLET, EXTENDED RELEASE ORAL EVERY MORNING
COMMUNITY
Start: 2025-07-21

## 2025-07-31 LAB
FERRITIN SERPL-MCNC: 40 NG/ML (ref 30–307)
HBV SURFACE AG SER QL: NORMAL
HCV AB SER QL: NORMAL
IRON SATN MFR SERPL: 20 % (ref 15–50)
IRON SERPL-MCNC: 94 UG/DL (ref 50–212)
TIBC SERPL-MCNC: 473.2 UG/DL (ref 250–450)
TRANSFERRIN SERPL-MCNC: 338 MG/DL (ref 203–362)
UIBC SERPL-MCNC: 379 UG/DL (ref 155–355)

## 2025-08-01 LAB
ACTIN IGG SERPL-ACNC: 5 UNITS (ref 0–19)
DSDNA IGG SERPL IA-ACNC: <0.9 IU/ML (ref ?–15)
NUCLEAR IGG SER IA-RTO: <0.09 RATIO (ref ?–1)

## 2025-08-09 LAB
APOB+LDLR+PCSK9 GENE MUT ANL BLD/T: NOT DETECTED
BRCA1+BRCA2 DEL+DUP + FULL MUT ANL BLD/T: NOT DETECTED
MLH1+MSH2+MSH6+PMS2 GN DEL+DUP+FUL M: NOT DETECTED

## 2025-08-15 ENCOUNTER — TELEPHONE (OUTPATIENT)
Age: 85
End: 2025-08-15